# Patient Record
Sex: MALE | Race: BLACK OR AFRICAN AMERICAN | NOT HISPANIC OR LATINO | Employment: FULL TIME | URBAN - METROPOLITAN AREA
[De-identification: names, ages, dates, MRNs, and addresses within clinical notes are randomized per-mention and may not be internally consistent; named-entity substitution may affect disease eponyms.]

---

## 2016-11-28 LAB — CRC RECOMMENDATION EXT: NORMAL

## 2017-02-17 ENCOUNTER — HISTORICAL (OUTPATIENT)
Dept: ADMINISTRATIVE | Facility: HOSPITAL | Age: 52
End: 2017-02-17

## 2017-05-16 ENCOUNTER — HISTORICAL (OUTPATIENT)
Dept: ADMINISTRATIVE | Facility: HOSPITAL | Age: 52
End: 2017-05-16

## 2017-05-16 LAB
ABS NEUT (OLG): 4.2 X10(3)/MCL (ref 2.1–9.2)
ALBUMIN SERPL-MCNC: 4 GM/DL (ref 3.4–5)
ALBUMIN/GLOB SERPL: 1 RATIO (ref 1–2)
ALP SERPL-CCNC: 81 UNIT/L (ref 20–120)
ALT SERPL-CCNC: 40 UNIT/L
AST SERPL-CCNC: 36 UNIT/L
BASOPHILS # BLD AUTO: 0.01 X10(3)/MCL
BASOPHILS NFR BLD AUTO: 0 % (ref 0–1)
BILIRUB SERPL-MCNC: 0.8 MG/DL
BILIRUBIN DIRECT+TOT PNL SERPL-MCNC: 0.1 MG/DL
BILIRUBIN DIRECT+TOT PNL SERPL-MCNC: 0.7 MG/DL
BUN SERPL-MCNC: 13 MG/DL (ref 7–25)
CALCIUM SERPL-MCNC: 9.7 MG/DL (ref 8.4–10.3)
CHLORIDE SERPL-SCNC: 99 MMOL/L (ref 96–110)
CO2 SERPL-SCNC: 33 MMOL/L (ref 24–32)
CREAT SERPL-MCNC: 1.08 MG/DL (ref 0.7–1.4)
EOSINOPHIL # BLD AUTO: 0.16 10*3/UL
EOSINOPHIL NFR BLD AUTO: 2 % (ref 0–5)
ERYTHROCYTE [DISTWIDTH] IN BLOOD BY AUTOMATED COUNT: 12.1 % (ref 11.5–14.5)
GLOBULIN SER-MCNC: 3.9 GM/ML (ref 2.3–3.5)
GLUCOSE SERPL-MCNC: 102 MG/DL (ref 65–99)
HCT VFR BLD AUTO: 44.5 % (ref 40–51)
HGB BLD-MCNC: 14.8 GM/DL (ref 13.5–17.5)
IMM GRANULOCYTES # BLD AUTO: 0.01 10*3/UL
IMM GRANULOCYTES NFR BLD AUTO: 0 %
LYMPHOCYTES # BLD AUTO: 2.47 X10(3)/MCL
LYMPHOCYTES NFR BLD AUTO: 33 % (ref 15–40)
MCH RBC QN AUTO: 31.3 PG (ref 26–34)
MCHC RBC AUTO-ENTMCNC: 33.3 GM/DL (ref 31–37)
MCV RBC AUTO: 94.1 FL (ref 80–100)
MONOCYTES # BLD AUTO: 0.65 X10(3)/MCL
MONOCYTES NFR BLD AUTO: 9 % (ref 4–12)
NEUTROPHILS # BLD AUTO: 4.2 X10(3)/MCL
NEUTROPHILS NFR BLD AUTO: 56 X10(3)/MCL
PLATELET # BLD AUTO: 170 X10(3)/MCL (ref 130–400)
PMV BLD AUTO: 10.4 FL (ref 7.4–10.4)
POTASSIUM SERPL-SCNC: 4.6 MMOL/L (ref 3.6–5.2)
PROT SERPL-MCNC: 7.9 GM/DL (ref 6–8)
RBC # BLD AUTO: 4.73 X10(6)/MCL (ref 4.5–5.9)
SODIUM SERPL-SCNC: 139 MMOL/L (ref 135–146)
WBC # SPEC AUTO: 7.5 X10(3)/MCL (ref 4.5–11)

## 2017-09-06 ENCOUNTER — HISTORICAL (OUTPATIENT)
Dept: ADMINISTRATIVE | Facility: HOSPITAL | Age: 52
End: 2017-09-06

## 2017-09-06 LAB
ABS NEUT (OLG): 3.27 X10(3)/MCL (ref 2.1–9.2)
ABS NEUT (OLG): 3.31 X10(3)/MCL (ref 2.1–9.2)
ALBUMIN SERPL-MCNC: 3.8 GM/DL (ref 3.4–5)
ALBUMIN/GLOB SERPL: 1 RATIO (ref 1–2)
ALP SERPL-CCNC: 115 UNIT/L (ref 45–117)
ALT SERPL-CCNC: 35 UNIT/L (ref 12–78)
APPEARANCE, UA: CLEAR
AST SERPL-CCNC: 26 UNIT/L (ref 15–37)
BACTERIA #/AREA URNS AUTO: ABNORMAL /[HPF]
BASOPHILS # BLD AUTO: 0.02 X10(3)/MCL
BASOPHILS # BLD AUTO: 0.03 X10(3)/MCL
BASOPHILS NFR BLD AUTO: 0 % (ref 0–1)
BASOPHILS NFR BLD AUTO: 0 % (ref 0–1)
BILIRUB SERPL-MCNC: 0.3 MG/DL (ref 0.2–1)
BILIRUB UR QL STRIP: NEGATIVE
BILIRUBIN DIRECT+TOT PNL SERPL-MCNC: 0.1 MG/DL
BILIRUBIN DIRECT+TOT PNL SERPL-MCNC: 0.2 MG/DL
BUN SERPL-MCNC: 9 MG/DL (ref 7–18)
CALCIUM SERPL-MCNC: 8.8 MG/DL (ref 8.5–10.1)
CD3+CD4+ CELLS # SPEC: 1368 UNIT/L (ref 589–1505)
CD3+CD4+ CELLS NFR BLD: 51.8 % (ref 31–59)
CHLORIDE SERPL-SCNC: 104 MMOL/L (ref 98–107)
CO2 SERPL-SCNC: 32 MMOL/L (ref 21–32)
COLOR UR: NORMAL
CREAT SERPL-MCNC: 1.2 MG/DL (ref 0.6–1.3)
DEPRECATED CALCIDIOL+CALCIFEROL SERPL-MC: 9.82 NG/ML (ref 30–80)
EOSINOPHIL # BLD AUTO: 0.18 10*3/UL
EOSINOPHIL # BLD AUTO: 0.2 X10(3)/MCL
EOSINOPHIL NFR BLD AUTO: 3 % (ref 0–5)
EOSINOPHIL NFR BLD AUTO: 3 % (ref 0–5)
ERYTHROCYTE [DISTWIDTH] IN BLOOD BY AUTOMATED COUNT: 12.5 % (ref 11.5–14.5)
ERYTHROCYTE [DISTWIDTH] IN BLOOD BY AUTOMATED COUNT: 12.7 % (ref 11.5–14.5)
EST. AVERAGE GLUCOSE BLD GHB EST-MCNC: 120 MG/DL
GLOBULIN SER-MCNC: 3.9 GM/ML (ref 2.3–3.5)
GLUCOSE (UA): NORMAL
GLUCOSE SERPL-MCNC: 94 MG/DL (ref 74–106)
HBA1C MFR BLD: 5.8 % (ref 4.2–6.3)
HCT VFR BLD AUTO: 39.8 % (ref 40–51)
HCT VFR BLD AUTO: 40.4 % (ref 40–51)
HCV AB SERPL QL IA: NONREACTIVE
HGB BLD-MCNC: 13.5 GM/DL (ref 13.5–17.5)
HGB BLD-MCNC: 13.6 GM/DL (ref 13.5–17.5)
HGB UR QL STRIP: NEGATIVE
HYALINE CASTS #/AREA URNS LPF: ABNORMAL /[LPF]
IMM GRANULOCYTES # BLD AUTO: 0.01 10*3/UL
IMM GRANULOCYTES # BLD AUTO: 0.02 10*3/UL
IMM GRANULOCYTES NFR BLD AUTO: 0 %
IMM GRANULOCYTES NFR BLD AUTO: 0 %
KETONES UR QL STRIP: NEGATIVE
LEUKOCYTE ESTERASE UR QL STRIP: NEGATIVE
LYMPHOCYTES # BLD AUTO: 2.62 X10(3)/MCL
LYMPHOCYTES # BLD AUTO: 2.67 X10(3)/MCL
LYMPHOCYTES # BLD AUTO: 2640 UNIT/L (ref 1260–5520)
LYMPHOCYTES NFR BLD AUTO: 39 % (ref 15–40)
LYMPHOCYTES NFR BLD AUTO: 40 % (ref 15–40)
LYMPHOCYTES NFR LN MANUAL: 40 % (ref 28–48)
LYMPHOMA - T-CELL MARKERS SPEC-IMP: NORMAL
MCH RBC QN AUTO: 31.3 PG (ref 26–34)
MCH RBC QN AUTO: 32.2 PG (ref 26–34)
MCHC RBC AUTO-ENTMCNC: 33.4 GM/DL (ref 31–37)
MCHC RBC AUTO-ENTMCNC: 34.2 GM/DL (ref 31–37)
MCV RBC AUTO: 93.5 FL (ref 80–100)
MCV RBC AUTO: 94.3 FL (ref 80–100)
MONOCYTES # BLD AUTO: 0.46 X10(3)/MCL
MONOCYTES # BLD AUTO: 0.54 X10(3)/MCL
MONOCYTES NFR BLD AUTO: 7 % (ref 4–12)
MONOCYTES NFR BLD AUTO: 8 % (ref 4–12)
NEG CONT SPOT COUNT: 0
NEUTROPHILS # BLD AUTO: 3.27 X10(3)/MCL
NEUTROPHILS # BLD AUTO: 3.31 X10(3)/MCL
NEUTROPHILS NFR BLD AUTO: 49 X10(3)/MCL
NEUTROPHILS NFR BLD AUTO: 50 X10(3)/MCL
NITRITE UR QL STRIP: NEGATIVE
PANEL A SPOT COUNT: 1
PANEL B SPOT COUNT: 1
PH UR STRIP: 5.5 [PH] (ref 4.5–8)
PLATELET # BLD AUTO: 137 X10(3)/MCL (ref 130–400)
PLATELET # BLD AUTO: 142 X10(3)/MCL (ref 130–400)
PMV BLD AUTO: 10.4 FL (ref 7.4–10.4)
PMV BLD AUTO: 10.5 FL (ref 7.4–10.4)
POS CONT SPOT COUNT: >20
POTASSIUM SERPL-SCNC: 3.3 MMOL/L (ref 3.5–5.1)
PROT SERPL-MCNC: 7.7 GM/DL (ref 6.4–8.2)
PROT UR QL STRIP: NEGATIVE
RBC # BLD AUTO: 4.22 X10(6)/MCL (ref 4.5–5.9)
RBC # BLD AUTO: 4.32 X10(6)/MCL (ref 4.5–5.9)
RBC #/AREA URNS AUTO: ABNORMAL /[HPF]
SODIUM SERPL-SCNC: 140 MMOL/L (ref 136–145)
SP GR UR STRIP: 1.02 (ref 1–1.03)
SQUAMOUS #/AREA URNS LPF: ABNORMAL /[LPF]
T PALLIDUM AB SER QL: NONREACTIVE
T-SPOT.TB: NEGATIVE
TSH SERPL-ACNC: 1.67 MIU/L (ref 0.36–3.74)
UROBILINOGEN UR STRIP-ACNC: NORMAL
WBC # BLD AUTO: 6600 /MM3 (ref 4500–11500)
WBC # SPEC AUTO: 6.6 X10(3)/MCL (ref 4.5–11)
WBC # SPEC AUTO: 6.8 X10(3)/MCL (ref 4.5–11)
WBC #/AREA URNS AUTO: ABNORMAL /HPF

## 2017-10-09 ENCOUNTER — HISTORICAL (OUTPATIENT)
Dept: LAB | Facility: HOSPITAL | Age: 52
End: 2017-10-09

## 2017-10-09 LAB
HBV SURFACE AB SER-ACNC: 26.48 MIU/ML
HBV SURFACE AB SERPL IA-ACNC: REACTIVE M[IU]/ML

## 2018-01-15 ENCOUNTER — HISTORICAL (OUTPATIENT)
Dept: RADIOLOGY | Facility: HOSPITAL | Age: 53
End: 2018-01-15

## 2018-01-15 ENCOUNTER — HISTORICAL (OUTPATIENT)
Dept: ADMINISTRATIVE | Facility: HOSPITAL | Age: 53
End: 2018-01-15

## 2018-01-15 LAB
ABS NEUT (OLG): 5.2 X10(3)/MCL (ref 2.1–9.2)
ABS NEUT (OLG): 5.25 X10(3)/MCL (ref 2.1–9.2)
ALBUMIN SERPL-MCNC: 4.1 GM/DL (ref 3.4–5)
ALBUMIN/GLOB SERPL: 1 RATIO (ref 1–2)
ALP SERPL-CCNC: 96 UNIT/L (ref 45–117)
ALT SERPL-CCNC: 31 UNIT/L (ref 12–78)
AMYLASE SERPL-CCNC: 64 UNIT/L (ref 25–115)
AST SERPL-CCNC: 23 UNIT/L (ref 15–37)
BASOPHILS # BLD AUTO: 0.03 X10(3)/MCL
BASOPHILS # BLD AUTO: 0.03 X10(3)/MCL
BASOPHILS NFR BLD AUTO: 0 % (ref 0–1)
BASOPHILS NFR BLD AUTO: 0 % (ref 0–1)
BILIRUB SERPL-MCNC: 0.3 MG/DL (ref 0.2–1)
BILIRUBIN DIRECT+TOT PNL SERPL-MCNC: <0.1 MG/DL
BILIRUBIN DIRECT+TOT PNL SERPL-MCNC: ABNORMAL MG/DL
BUN SERPL-MCNC: 17 MG/DL (ref 7–18)
CALCIUM SERPL-MCNC: 9.8 MG/DL (ref 8.5–10.1)
CD3+CD4+ CELLS # SPEC: 1401 UNIT/L (ref 589–1505)
CD3+CD4+ CELLS NFR BLD: 50.2 % (ref 31–59)
CHLORIDE SERPL-SCNC: 100 MMOL/L (ref 98–107)
CO2 SERPL-SCNC: 29 MMOL/L (ref 21–32)
CREAT SERPL-MCNC: 1.2 MG/DL (ref 0.6–1.3)
DEPRECATED CALCIDIOL+CALCIFEROL SERPL-MC: 57.41 NG/ML (ref 30–80)
EOSINOPHIL # BLD AUTO: 0.24 10*3/UL
EOSINOPHIL # BLD AUTO: 0.25 10*3/UL
EOSINOPHIL NFR BLD AUTO: 3 % (ref 0–5)
EOSINOPHIL NFR BLD AUTO: 3 % (ref 0–5)
ERYTHROCYTE [DISTWIDTH] IN BLOOD BY AUTOMATED COUNT: 12.1 % (ref 11.5–14.5)
ERYTHROCYTE [DISTWIDTH] IN BLOOD BY AUTOMATED COUNT: 12.2 % (ref 11.5–14.5)
GLOBULIN SER-MCNC: 4.4 GM/ML (ref 2.3–3.5)
GLUCOSE SERPL-MCNC: 97 MG/DL (ref 74–106)
HCT VFR BLD AUTO: 45.4 % (ref 40–51)
HCT VFR BLD AUTO: 45.5 % (ref 40–51)
HGB BLD-MCNC: 15.7 GM/DL (ref 13.5–17.5)
HGB BLD-MCNC: 15.8 GM/DL (ref 13.5–17.5)
IMM GRANULOCYTES # BLD AUTO: 0.02 10*3/UL
IMM GRANULOCYTES # BLD AUTO: 0.03 10*3/UL
IMM GRANULOCYTES NFR BLD AUTO: 0 %
IMM GRANULOCYTES NFR BLD AUTO: 0 %
LIPASE SERPL-CCNC: 170 UNIT/L (ref 73–393)
LYMPHOCYTES # BLD AUTO: 2.77 X10(3)/MCL
LYMPHOCYTES # BLD AUTO: 2.78 X10(3)/MCL
LYMPHOCYTES # BLD AUTO: 2790 UNIT/L (ref 1260–5520)
LYMPHOCYTES NFR BLD AUTO: 30 % (ref 15–40)
LYMPHOCYTES NFR BLD AUTO: 31 % (ref 15–40)
LYMPHOCYTES NFR LN MANUAL: 31 % (ref 28–48)
LYMPHOMA - T-CELL MARKERS SPEC-IMP: NORMAL
MCH RBC QN AUTO: 32 PG (ref 26–34)
MCH RBC QN AUTO: 32 PG (ref 26–34)
MCHC RBC AUTO-ENTMCNC: 34.6 GM/DL (ref 31–37)
MCHC RBC AUTO-ENTMCNC: 34.7 GM/DL (ref 31–37)
MCV RBC AUTO: 92.3 FL (ref 80–100)
MCV RBC AUTO: 92.7 FL (ref 80–100)
MONOCYTES # BLD AUTO: 0.72 X10(3)/MCL
MONOCYTES # BLD AUTO: 0.77 X10(3)/MCL
MONOCYTES NFR BLD AUTO: 8 % (ref 4–12)
MONOCYTES NFR BLD AUTO: 8 % (ref 4–12)
NEUTROPHILS # BLD AUTO: 5.2 X10(3)/MCL
NEUTROPHILS # BLD AUTO: 5.25 X10(3)/MCL
NEUTROPHILS NFR BLD AUTO: 58 X10(3)/MCL
NEUTROPHILS NFR BLD AUTO: 58 X10(3)/MCL
PLATELET # BLD AUTO: 171 X10(3)/MCL (ref 130–400)
PLATELET # BLD AUTO: 174 X10(3)/MCL (ref 130–400)
PMV BLD AUTO: 10.6 FL (ref 7.4–10.4)
PMV BLD AUTO: 10.8 FL (ref 7.4–10.4)
POTASSIUM SERPL-SCNC: 3.6 MMOL/L (ref 3.5–5.1)
PROT SERPL-MCNC: 8.5 GM/DL (ref 6.4–8.2)
RBC # BLD AUTO: 4.9 X10(6)/MCL (ref 4.5–5.9)
RBC # BLD AUTO: 4.93 X10(6)/MCL (ref 4.5–5.9)
SODIUM SERPL-SCNC: 137 MMOL/L (ref 136–145)
WBC # BLD AUTO: 9000 /MM3 (ref 4500–11500)
WBC # SPEC AUTO: 9 X10(3)/MCL (ref 4.5–11)
WBC # SPEC AUTO: 9.1 X10(3)/MCL (ref 4.5–11)

## 2018-02-15 ENCOUNTER — HISTORICAL (OUTPATIENT)
Dept: ADMINISTRATIVE | Facility: HOSPITAL | Age: 53
End: 2018-02-15

## 2018-07-02 ENCOUNTER — HISTORICAL (OUTPATIENT)
Dept: ADMINISTRATIVE | Facility: HOSPITAL | Age: 53
End: 2018-07-02

## 2018-07-02 LAB
ABS NEUT (OLG): 4.42 X10(3)/MCL (ref 2.1–9.2)
ABS NEUT (OLG): 4.64 X10(3)/MCL (ref 2.1–9.2)
ALBUMIN SERPL-MCNC: 4.1 GM/DL (ref 3.4–5)
ALBUMIN/GLOB SERPL: 1 RATIO (ref 1–2)
ALP SERPL-CCNC: 109 UNIT/L (ref 45–117)
ALT SERPL-CCNC: 36 UNIT/L (ref 12–78)
AST SERPL-CCNC: 28 UNIT/L (ref 15–37)
BASOPHILS # BLD AUTO: 0.03 X10(3)/MCL
BASOPHILS # BLD AUTO: 0.03 X10(3)/MCL
BASOPHILS NFR BLD AUTO: 0 %
BASOPHILS NFR BLD AUTO: 0 %
BILIRUB SERPL-MCNC: 0.3 MG/DL (ref 0.2–1)
BILIRUBIN DIRECT+TOT PNL SERPL-MCNC: <0.1 MG/DL
BILIRUBIN DIRECT+TOT PNL SERPL-MCNC: >0.2 MG/DL
BUN SERPL-MCNC: 22 MG/DL (ref 7–18)
CALCIUM SERPL-MCNC: 9.2 MG/DL (ref 8.5–10.1)
CD3+CD4+ CELLS # SPEC: 1899 UNIT/L (ref 589–1505)
CD3+CD4+ CELLS NFR BLD: 54.7 % (ref 31–59)
CHLORIDE SERPL-SCNC: 100 MMOL/L (ref 98–107)
CO2 SERPL-SCNC: 29 MMOL/L (ref 21–32)
CREAT SERPL-MCNC: 1.3 MG/DL (ref 0.6–1.3)
EOSINOPHIL # BLD AUTO: 0.26 X10(3)/MCL
EOSINOPHIL # BLD AUTO: 0.27 10*3/UL
EOSINOPHIL NFR BLD AUTO: 3 %
EOSINOPHIL NFR BLD AUTO: 3 %
ERYTHROCYTE [DISTWIDTH] IN BLOOD BY AUTOMATED COUNT: 12.4 % (ref 11.5–14.5)
ERYTHROCYTE [DISTWIDTH] IN BLOOD BY AUTOMATED COUNT: 12.4 % (ref 11.5–14.5)
GLOBULIN SER-MCNC: 4.3 GM/ML (ref 2.3–3.5)
GLUCOSE SERPL-MCNC: 95 MG/DL (ref 74–106)
HCT VFR BLD AUTO: 47.3 % (ref 40–51)
HCT VFR BLD AUTO: 47.3 % (ref 40–51)
HGB BLD-MCNC: 15.9 GM/DL (ref 13.5–17.5)
HGB BLD-MCNC: 16.1 GM/DL (ref 13.5–17.5)
IMM GRANULOCYTES # BLD AUTO: 0.05 10*3/UL
IMM GRANULOCYTES # BLD AUTO: 0.05 10*3/UL
IMM GRANULOCYTES NFR BLD AUTO: 0 %
IMM GRANULOCYTES NFR BLD AUTO: 1 %
LYMPHOCYTES # BLD AUTO: 3.46 X10(3)/MCL
LYMPHOCYTES # BLD AUTO: 3.59 X10(3)/MCL
LYMPHOCYTES # BLD AUTO: 3471 UNIT/L (ref 1260–5520)
LYMPHOCYTES NFR BLD AUTO: 38 % (ref 13–40)
LYMPHOCYTES NFR BLD AUTO: 39 % (ref 13–40)
LYMPHOCYTES NFR LN MANUAL: 39 % (ref 28–48)
LYMPHOMA - T-CELL MARKERS SPEC-IMP: ABNORMAL
MCH RBC QN AUTO: 32.2 PG (ref 26–34)
MCH RBC QN AUTO: 32.6 PG (ref 26–34)
MCHC RBC AUTO-ENTMCNC: 33.6 GM/DL (ref 31–37)
MCHC RBC AUTO-ENTMCNC: 34 GM/DL (ref 31–37)
MCV RBC AUTO: 95.7 FL (ref 80–100)
MCV RBC AUTO: 95.7 FL (ref 80–100)
MONOCYTES # BLD AUTO: 0.7 X10(3)/MCL
MONOCYTES # BLD AUTO: 0.78 X10(3)/MCL
MONOCYTES NFR BLD AUTO: 8 % (ref 4–12)
MONOCYTES NFR BLD AUTO: 8 % (ref 4–12)
NEUTROPHILS # BLD AUTO: 4.42 X10(3)/MCL
NEUTROPHILS # BLD AUTO: 4.64 X10(3)/MCL
NEUTROPHILS NFR BLD AUTO: 50 X10(3)/MCL
NEUTROPHILS NFR BLD AUTO: 50 X10(3)/MCL
PLATELET # BLD AUTO: 176 X10(3)/MCL (ref 130–400)
PLATELET # BLD AUTO: 178 X10(3)/MCL (ref 130–400)
PMV BLD AUTO: 10.9 FL (ref 7.4–10.4)
PMV BLD AUTO: 11.5 FL (ref 7.4–10.4)
POTASSIUM SERPL-SCNC: 3.4 MMOL/L (ref 3.5–5.1)
PROT SERPL-MCNC: 8.4 GM/DL (ref 6.4–8.2)
RBC # BLD AUTO: 4.94 X10(6)/MCL (ref 4.5–5.9)
RBC # BLD AUTO: 4.94 X10(6)/MCL (ref 4.5–5.9)
SODIUM SERPL-SCNC: 136 MMOL/L (ref 136–145)
WBC # BLD AUTO: 8900 /MM3 (ref 4500–11500)
WBC # SPEC AUTO: 8.9 X10(3)/MCL (ref 4.5–11)
WBC # SPEC AUTO: 9.4 X10(3)/MCL (ref 4.5–11)

## 2018-10-10 ENCOUNTER — HISTORICAL (OUTPATIENT)
Dept: INTERNAL MEDICINE | Facility: CLINIC | Age: 53
End: 2018-10-10

## 2018-10-10 LAB
APPEARANCE, UA: CLEAR
BACTERIA #/AREA URNS AUTO: ABNORMAL /[HPF]
BILIRUB UR QL STRIP: NEGATIVE
CHOLEST SERPL-MCNC: 145 MG/DL
CHOLEST/HDLC SERPL: 3.6 {RATIO} (ref 0–5)
COLOR UR: YELLOW
EST. AVERAGE GLUCOSE BLD GHB EST-MCNC: 117 MG/DL
GLUCOSE (UA): NORMAL
HBA1C MFR BLD: 5.7 % (ref 4.2–6.3)
HDLC SERPL-MCNC: 40 MG/DL
HGB UR QL STRIP: NEGATIVE
HYALINE CASTS #/AREA URNS LPF: ABNORMAL /[LPF]
KETONES UR QL STRIP: ABNORMAL
LDLC SERPL CALC-MCNC: 72 MG/DL (ref 0–130)
LEUKOCYTE ESTERASE UR QL STRIP: 25 LEU/UL
NITRITE UR QL STRIP: NEGATIVE
PH UR STRIP: 6 [PH] (ref 4.5–8)
PROT UR QL STRIP: 10 MG/DL
PSA SERPL-MCNC: 0.8 NG/ML
RBC #/AREA URNS AUTO: ABNORMAL /[HPF]
SP GR UR STRIP: 1.03 (ref 1–1.03)
SQUAMOUS #/AREA URNS LPF: ABNORMAL /[LPF]
TRIGL SERPL-MCNC: 167 MG/DL
TSH SERPL-ACNC: 1.51 MIU/L (ref 0.36–3.74)
UROBILINOGEN UR STRIP-ACNC: 4 MG/DL
VLDLC SERPL CALC-MCNC: 33 MG/DL
WBC #/AREA URNS AUTO: ABNORMAL /HPF

## 2019-05-31 ENCOUNTER — HISTORICAL (OUTPATIENT)
Dept: ADMINISTRATIVE | Facility: HOSPITAL | Age: 54
End: 2019-05-31

## 2019-05-31 LAB
ABS NEUT (OLG): 3.64 X10(3)/MCL (ref 2.1–9.2)
ALBUMIN SERPL-MCNC: 3.9 GM/DL (ref 3.4–5)
ALBUMIN/GLOB SERPL: 1.1 RATIO (ref 1.1–2)
ALP SERPL-CCNC: 113 UNIT/L (ref 45–117)
ALT SERPL-CCNC: 30 UNIT/L (ref 12–78)
APPEARANCE, UA: CLEAR
AST SERPL-CCNC: 24 UNIT/L (ref 15–37)
BACTERIA #/AREA URNS AUTO: ABNORMAL /[HPF]
BASOPHILS # BLD AUTO: 0.02 X10(3)/MCL
BASOPHILS NFR BLD AUTO: 0 %
BILIRUB SERPL-MCNC: 0.2 MG/DL (ref 0.2–1)
BILIRUB UR QL STRIP: NEGATIVE
BILIRUBIN DIRECT+TOT PNL SERPL-MCNC: 0.1 MG/DL
BILIRUBIN DIRECT+TOT PNL SERPL-MCNC: 0.1 MG/DL
BUN SERPL-MCNC: 15 MG/DL (ref 7–18)
CALCIUM SERPL-MCNC: 9.2 MG/DL (ref 8.5–10.1)
CD3+CD4+ CELLS # SPEC: 1386 UNIT/L (ref 589–1505)
CD3+CD4+ CELLS NFR BLD: 49.3 % (ref 31–59)
CHLORIDE SERPL-SCNC: 105 MMOL/L (ref 98–107)
CO2 SERPL-SCNC: 31 MMOL/L (ref 21–32)
COLOR UR: YELLOW
CREAT SERPL-MCNC: 1 MG/DL (ref 0.6–1.3)
DEPRECATED CALCIDIOL+CALCIFEROL SERPL-MC: 17.57 NG/ML (ref 30–80)
EOSINOPHIL # BLD AUTO: 0.23 10*3/UL
EOSINOPHIL NFR BLD AUTO: 3 %
ERYTHROCYTE [DISTWIDTH] IN BLOOD BY AUTOMATED COUNT: 12.5 % (ref 11.5–14.5)
GLOBULIN SER-MCNC: 3.7 GM/ML (ref 2.3–3.5)
GLUCOSE (UA): NORMAL
GLUCOSE SERPL-MCNC: 96 MG/DL (ref 74–106)
HCT VFR BLD AUTO: 42.5 % (ref 40–51)
HCV AB SERPL QL IA: NONREACTIVE
HGB BLD-MCNC: 14 GM/DL (ref 13.5–17.5)
HGB UR QL STRIP: NEGATIVE
HYALINE CASTS #/AREA URNS LPF: ABNORMAL /[LPF]
IMM GRANULOCYTES # BLD AUTO: 0.02 10*3/UL
IMM GRANULOCYTES NFR BLD AUTO: 0 %
KETONES UR QL STRIP: NEGATIVE
LEUKOCYTE ESTERASE UR QL STRIP: 75 LEU/UL
LYMPHOCYTES # BLD AUTO: 2.8 X10(3)/MCL
LYMPHOCYTES # BLD AUTO: 2812 UNIT/L (ref 1260–5520)
LYMPHOCYTES NFR BLD AUTO: 38 % (ref 13–40)
LYMPHOCYTES NFR LN MANUAL: 38 % (ref 28–48)
LYMPHOMA - T-CELL MARKERS SPEC-IMP: NORMAL
MCH RBC QN AUTO: 31.4 PG (ref 26–34)
MCHC RBC AUTO-ENTMCNC: 32.9 GM/DL (ref 31–37)
MCV RBC AUTO: 95.3 FL (ref 80–100)
MONOCYTES # BLD AUTO: 0.69 X10(3)/MCL
MONOCYTES NFR BLD AUTO: 9 % (ref 4–12)
NEUTROPHILS # BLD AUTO: 3.64 X10(3)/MCL
NEUTROPHILS NFR BLD AUTO: 49 X10(3)/MCL
NITRITE UR QL STRIP: NEGATIVE
PH UR STRIP: 6 [PH] (ref 4.5–8)
PLATELET # BLD AUTO: 155 X10(3)/MCL (ref 130–400)
PMV BLD AUTO: 10.4 FL (ref 7.4–10.4)
POTASSIUM SERPL-SCNC: 3.8 MMOL/L (ref 3.5–5.1)
PROT SERPL-MCNC: 7.6 GM/DL (ref 6.4–8.2)
PROT UR QL STRIP: NEGATIVE
RBC # BLD AUTO: 4.46 X10(6)/MCL (ref 4.5–5.9)
RBC #/AREA URNS AUTO: ABNORMAL /[HPF]
SODIUM SERPL-SCNC: 139 MMOL/L (ref 136–145)
SP GR UR STRIP: 1.02 (ref 1–1.03)
SQUAMOUS #/AREA URNS LPF: ABNORMAL /[LPF]
T PALLIDUM AB SER QL: NONREACTIVE
UROBILINOGEN UR STRIP-ACNC: 2 MG/DL
WBC # BLD AUTO: 7400 /MM3 (ref 4500–11500)
WBC # SPEC AUTO: 7.4 X10(3)/MCL (ref 4.5–11)
WBC #/AREA URNS AUTO: ABNORMAL /HPF

## 2019-10-09 ENCOUNTER — HISTORICAL (OUTPATIENT)
Dept: INTERNAL MEDICINE | Facility: CLINIC | Age: 54
End: 2019-10-09

## 2019-10-09 LAB
ABS NEUT (OLG): 3.5 X10(3)/MCL (ref 2.1–9.2)
ALBUMIN SERPL-MCNC: 3.7 GM/DL (ref 3.4–5)
ALBUMIN/GLOB SERPL: 0.9 RATIO (ref 1.1–2)
ALP SERPL-CCNC: 134 UNIT/L (ref 45–117)
ALT SERPL-CCNC: 33 UNIT/L (ref 12–78)
APPEARANCE, UA: CLEAR
AST SERPL-CCNC: 29 UNIT/L (ref 15–37)
BACTERIA #/AREA URNS AUTO: ABNORMAL /[HPF]
BASOPHILS # BLD AUTO: 0 X10(3)/MCL (ref 0–0.2)
BASOPHILS NFR BLD AUTO: 1 %
BILIRUB SERPL-MCNC: 0.2 MG/DL (ref 0.2–1)
BILIRUB UR QL STRIP: NEGATIVE
BILIRUBIN DIRECT+TOT PNL SERPL-MCNC: <0.1 MG/DL (ref 0–0.2)
BILIRUBIN DIRECT+TOT PNL SERPL-MCNC: >0.1 MG/DL
BUN SERPL-MCNC: 15 MG/DL (ref 7–18)
CALCIUM SERPL-MCNC: 8.8 MG/DL (ref 8.5–10.1)
CHLORIDE SERPL-SCNC: 107 MMOL/L (ref 98–107)
CHOLEST SERPL-MCNC: 191 MG/DL
CHOLEST/HDLC SERPL: 3.8 {RATIO} (ref 0–5)
CO2 SERPL-SCNC: 31 MMOL/L (ref 21–32)
COLOR UR: ABNORMAL
CREAT SERPL-MCNC: 1.3 MG/DL (ref 0.6–1.3)
EOSINOPHIL # BLD AUTO: 0.3 X10(3)/MCL (ref 0–0.9)
EOSINOPHIL NFR BLD AUTO: 4 %
ERYTHROCYTE [DISTWIDTH] IN BLOOD BY AUTOMATED COUNT: 12.5 % (ref 11.5–14.5)
GLOBULIN SER-MCNC: 3.9 GM/ML (ref 2.3–3.5)
GLUCOSE (UA): NORMAL
GLUCOSE SERPL-MCNC: 106 MG/DL (ref 74–106)
HCT VFR BLD AUTO: 44 % (ref 40–51)
HDLC SERPL-MCNC: 50 MG/DL (ref 40–59)
HGB BLD-MCNC: 14.7 GM/DL (ref 13.5–17.5)
HGB UR QL STRIP: NEGATIVE
HYALINE CASTS #/AREA URNS LPF: ABNORMAL /[LPF]
IMM GRANULOCYTES # BLD AUTO: 0.01 10*3/UL
IMM GRANULOCYTES NFR BLD AUTO: 0 %
KETONES UR QL STRIP: NEGATIVE
LDLC SERPL CALC-MCNC: 99 MG/DL
LEUKOCYTE ESTERASE UR QL STRIP: NEGATIVE
LYMPHOCYTES # BLD AUTO: 3.4 X10(3)/MCL (ref 0.6–4.6)
LYMPHOCYTES NFR BLD AUTO: 42 %
MCH RBC QN AUTO: 32.1 PG (ref 26–34)
MCHC RBC AUTO-ENTMCNC: 33.4 GM/DL (ref 31–37)
MCV RBC AUTO: 96.1 FL (ref 80–100)
MONOCYTES # BLD AUTO: 0.8 X10(3)/MCL (ref 0.1–1.3)
MONOCYTES NFR BLD AUTO: 10 %
NEUTROPHILS # BLD AUTO: 3.5 X10(3)/MCL (ref 2.1–9.2)
NEUTROPHILS NFR BLD AUTO: 44 %
NITRITE UR QL STRIP: NEGATIVE
PH UR STRIP: 6 [PH] (ref 4.5–8)
PLATELET # BLD AUTO: 159 X10(3)/MCL (ref 130–400)
PMV BLD AUTO: 10.2 FL (ref 7.4–10.4)
POTASSIUM SERPL-SCNC: 4.2 MMOL/L (ref 3.5–5.1)
PROT SERPL-MCNC: 7.6 GM/DL (ref 6.4–8.2)
PROT UR QL STRIP: 10 MG/DL
RBC # BLD AUTO: 4.58 X10(6)/MCL (ref 4.5–5.9)
RBC #/AREA URNS AUTO: ABNORMAL /[HPF]
SODIUM SERPL-SCNC: 140 MMOL/L (ref 136–145)
SP GR UR STRIP: 1.03 (ref 1–1.03)
SQUAMOUS #/AREA URNS LPF: ABNORMAL /[LPF]
TRIGL SERPL-MCNC: 208 MG/DL
UROBILINOGEN UR STRIP-ACNC: 2 MG/DL
VLDLC SERPL CALC-MCNC: 42 MG/DL
WBC # SPEC AUTO: 8 X10(3)/MCL (ref 4.5–11)
WBC #/AREA URNS AUTO: ABNORMAL /HPF

## 2019-11-11 ENCOUNTER — HISTORICAL (OUTPATIENT)
Dept: ADMINISTRATIVE | Facility: HOSPITAL | Age: 54
End: 2019-11-11

## 2019-11-11 LAB
ABS NEUT (OLG): 3.73 X10(3)/MCL (ref 2.1–9.2)
BASOPHILS # BLD AUTO: 0 X10(3)/MCL (ref 0–0.2)
BASOPHILS NFR BLD AUTO: 0 %
CD3+CD4+ CELLS # SPEC: 1743 UNIT/L (ref 589–1505)
CD3+CD4+ CELLS NFR BLD: 57.3 % (ref 31–59)
EOSINOPHIL # BLD AUTO: 0.3 X10(3)/MCL (ref 0–0.9)
EOSINOPHIL NFR BLD AUTO: 4 %
ERYTHROCYTE [DISTWIDTH] IN BLOOD BY AUTOMATED COUNT: 12.5 % (ref 11.5–14.5)
HCT VFR BLD AUTO: 43.1 % (ref 40–51)
HGB BLD-MCNC: 14.4 GM/DL (ref 13.5–17.5)
IMM GRANULOCYTES # BLD AUTO: 0.01 10*3/UL
IMM GRANULOCYTES NFR BLD AUTO: 0 %
LYMPHOCYTES # BLD AUTO: 3 X10(3)/MCL (ref 0.6–4.6)
LYMPHOCYTES # BLD AUTO: 3042 UNIT/L (ref 1260–5520)
LYMPHOCYTES NFR BLD AUTO: 39 %
LYMPHOCYTES NFR LN MANUAL: 39 % (ref 28–48)
LYMPHOMA - T-CELL MARKERS SPEC-IMP: ABNORMAL
MCH RBC QN AUTO: 32.3 PG (ref 26–34)
MCHC RBC AUTO-ENTMCNC: 33.4 GM/DL (ref 31–37)
MCV RBC AUTO: 96.6 FL (ref 80–100)
MONOCYTES # BLD AUTO: 0.7 X10(3)/MCL (ref 0.1–1.3)
MONOCYTES NFR BLD AUTO: 9 %
NEUTROPHILS # BLD AUTO: 3.73 X10(3)/MCL (ref 2.1–9.2)
NEUTROPHILS NFR BLD AUTO: 48 %
PLATELET # BLD AUTO: 165 X10(3)/MCL (ref 130–400)
PMV BLD AUTO: 10.3 FL (ref 7.4–10.4)
RBC # BLD AUTO: 4.46 X10(6)/MCL (ref 4.5–5.9)
WBC # BLD AUTO: 7800 /MM3 (ref 4500–11500)
WBC # SPEC AUTO: 7.8 X10(3)/MCL (ref 4.5–11)

## 2020-01-24 ENCOUNTER — HISTORICAL (OUTPATIENT)
Dept: INTERNAL MEDICINE | Facility: CLINIC | Age: 55
End: 2020-01-24

## 2020-01-24 LAB
ALBUMIN SERPL-MCNC: 3.6 GM/DL (ref 3.4–5)
ALBUMIN/GLOB SERPL: 0.9 RATIO (ref 1.1–2)
ALP SERPL-CCNC: 105 UNIT/L (ref 45–117)
ALT SERPL-CCNC: 32 UNIT/L (ref 12–78)
APPEARANCE, UA: CLEAR
AST SERPL-CCNC: 25 UNIT/L (ref 15–37)
BACTERIA #/AREA URNS AUTO: ABNORMAL /HPF
BILIRUB SERPL-MCNC: 0.3 MG/DL (ref 0.2–1)
BILIRUB UR QL STRIP: NEGATIVE
BILIRUBIN DIRECT+TOT PNL SERPL-MCNC: <0.1 MG/DL (ref 0–0.2)
BILIRUBIN DIRECT+TOT PNL SERPL-MCNC: >0.2 MG/DL
BUN SERPL-MCNC: 17 MG/DL (ref 7–18)
CALCIUM SERPL-MCNC: 9.1 MG/DL (ref 8.5–10.1)
CHLORIDE SERPL-SCNC: 104 MMOL/L (ref 98–107)
CHOLEST SERPL-MCNC: 186 MG/DL
CHOLEST/HDLC SERPL: 4.2 {RATIO} (ref 0–5)
CO2 SERPL-SCNC: 29 MMOL/L (ref 21–32)
COLOR UR: YELLOW
CREAT SERPL-MCNC: 1.2 MG/DL (ref 0.6–1.3)
DEPRECATED CALCIDIOL+CALCIFEROL SERPL-MC: 11 NG/ML (ref 20–80)
EST. AVERAGE GLUCOSE BLD GHB EST-MCNC: 157 MG/DL
GLOBULIN SER-MCNC: 4 GM/ML (ref 2.3–3.5)
GLUCOSE (UA): NEGATIVE
GLUCOSE SERPL-MCNC: 100 MG/DL (ref 74–106)
HBA1C MFR BLD: 7.1 % (ref 4.2–6.3)
HDLC SERPL-MCNC: 44 MG/DL (ref 40–59)
HGB UR QL STRIP: NEGATIVE
HYALINE CASTS #/AREA URNS LPF: ABNORMAL /LPF
KETONES UR QL STRIP: NEGATIVE
LDLC SERPL CALC-MCNC: 123 MG/DL
LEUKOCYTE ESTERASE UR QL STRIP: 25 LEU/UL
NITRITE UR QL STRIP: NEGATIVE
PH UR STRIP: 6 [PH] (ref 4.5–8)
POTASSIUM SERPL-SCNC: 3.8 MMOL/L (ref 3.5–5.1)
PROT SERPL-MCNC: 7.6 GM/DL (ref 6.4–8.2)
PROT UR QL STRIP: NEGATIVE
PSA SERPL-MCNC: 0.6 NG/ML
RBC #/AREA URNS AUTO: ABNORMAL /HPF
SODIUM SERPL-SCNC: 139 MMOL/L (ref 136–145)
SP GR UR STRIP: 1.03 (ref 1–1.03)
SQUAMOUS #/AREA URNS LPF: ABNORMAL /LPF
TRIGL SERPL-MCNC: 97 MG/DL
TSH SERPL-ACNC: 2.04 MIU/L (ref 0.36–3.74)
UROBILINOGEN UR STRIP-ACNC: 2 MG/DL
VLDLC SERPL CALC-MCNC: 19 MG/DL
WBC #/AREA URNS AUTO: ABNORMAL /HPF

## 2020-01-29 ENCOUNTER — HISTORICAL (OUTPATIENT)
Dept: ADMINISTRATIVE | Facility: HOSPITAL | Age: 55
End: 2020-01-29

## 2020-01-29 LAB
ABS NEUT (OLG): 6.18 X10(3)/MCL (ref 2.1–9.2)
BASOPHILS # BLD AUTO: 0 X10(3)/MCL (ref 0–0.2)
BASOPHILS NFR BLD AUTO: 0 %
EOSINOPHIL # BLD AUTO: 0.1 X10(3)/MCL (ref 0–0.9)
EOSINOPHIL NFR BLD AUTO: 2 %
ERYTHROCYTE [DISTWIDTH] IN BLOOD BY AUTOMATED COUNT: 12.5 % (ref 11.5–14.5)
HCT VFR BLD AUTO: 43.4 % (ref 40–51)
HGB BLD-MCNC: 14.3 GM/DL (ref 13.5–17.5)
IMM GRANULOCYTES # BLD AUTO: 0.02 10*3/UL
IMM GRANULOCYTES NFR BLD AUTO: 0 %
LYMPHOCYTES # BLD AUTO: 0.6 X10(3)/MCL (ref 0.6–4.6)
LYMPHOCYTES NFR BLD AUTO: 8 %
MCH RBC QN AUTO: 31.4 PG (ref 26–34)
MCHC RBC AUTO-ENTMCNC: 32.9 GM/DL (ref 31–37)
MCV RBC AUTO: 95.2 FL (ref 80–100)
MONOCYTES # BLD AUTO: 1 X10(3)/MCL (ref 0.1–1.3)
MONOCYTES NFR BLD AUTO: 12 %
NEUTROPHILS # BLD AUTO: 6.18 X10(3)/MCL (ref 2.1–9.2)
NEUTROPHILS NFR BLD AUTO: 77 %
PLATELET # BLD AUTO: 146 X10(3)/MCL (ref 130–400)
PMV BLD AUTO: 10.5 FL (ref 7.4–10.4)
RBC # BLD AUTO: 4.56 X10(6)/MCL (ref 4.5–5.9)
WBC # SPEC AUTO: 8 X10(3)/MCL (ref 4.5–11)

## 2020-08-04 ENCOUNTER — HISTORICAL (OUTPATIENT)
Dept: INTERNAL MEDICINE | Facility: CLINIC | Age: 55
End: 2020-08-04

## 2020-08-04 LAB
ABS NEUT (OLG): 4.2 X10(3)/MCL (ref 2.1–9.2)
ALBUMIN SERPL-MCNC: 3.6 GM/DL (ref 3.4–5)
ALBUMIN/GLOB SERPL: 0.9 RATIO (ref 1.1–2)
ALP SERPL-CCNC: 94 UNIT/L (ref 45–117)
ALT SERPL-CCNC: 32 UNIT/L (ref 12–78)
AST SERPL-CCNC: 20 UNIT/L (ref 15–37)
BASOPHILS # BLD AUTO: 0 X10(3)/MCL (ref 0–0.2)
BASOPHILS NFR BLD AUTO: 0 %
BILIRUB SERPL-MCNC: 0.3 MG/DL (ref 0.2–1)
BILIRUBIN DIRECT+TOT PNL SERPL-MCNC: <0.1 MG/DL (ref 0–0.2)
BILIRUBIN DIRECT+TOT PNL SERPL-MCNC: ABNORMAL MG/DL
BUN SERPL-MCNC: 16 MG/DL (ref 7–18)
CALCIUM SERPL-MCNC: 8.9 MG/DL (ref 8.5–10.1)
CD3+CD4+ CELLS # SPEC: 1583 UNIT/L (ref 589–1505)
CD3+CD4+ CELLS NFR BLD: 49.6 % (ref 31–59)
CHLORIDE SERPL-SCNC: 108 MMOL/L (ref 98–107)
CO2 SERPL-SCNC: 27 MMOL/L (ref 21–32)
CREAT SERPL-MCNC: 1.2 MG/DL (ref 0.6–1.3)
EOSINOPHIL # BLD AUTO: 0.2 X10(3)/MCL (ref 0–0.9)
EOSINOPHIL NFR BLD AUTO: 3 %
ERYTHROCYTE [DISTWIDTH] IN BLOOD BY AUTOMATED COUNT: 12.3 % (ref 11.5–14.5)
GLOBULIN SER-MCNC: 3.8 GM/ML (ref 2.3–3.5)
GLUCOSE SERPL-MCNC: 103 MG/DL (ref 74–106)
HCT VFR BLD AUTO: 41.4 % (ref 40–51)
HGB BLD-MCNC: 13.9 GM/DL (ref 13.5–17.5)
IMM GRANULOCYTES # BLD AUTO: 0.01 10*3/UL
IMM GRANULOCYTES NFR BLD AUTO: 0 %
LYMPHOCYTES # BLD AUTO: 3.2 X10(3)/MCL (ref 0.6–4.6)
LYMPHOCYTES # BLD AUTO: 3192 UNIT/L (ref 1260–5520)
LYMPHOCYTES NFR BLD AUTO: 38 %
LYMPHOCYTES NFR LN MANUAL: 38 % (ref 28–48)
LYMPHOMA - T-CELL MARKERS SPEC-IMP: ABNORMAL
MCH RBC QN AUTO: 32 PG (ref 26–34)
MCHC RBC AUTO-ENTMCNC: 33.6 GM/DL (ref 31–37)
MCV RBC AUTO: 95.4 FL (ref 80–100)
MONOCYTES # BLD AUTO: 0.7 X10(3)/MCL (ref 0.1–1.3)
MONOCYTES NFR BLD AUTO: 9 %
NEUTROPHILS # BLD AUTO: 4.2 X10(3)/MCL (ref 2.1–9.2)
NEUTROPHILS NFR BLD AUTO: 50 %
PLATELET # BLD AUTO: 151 X10(3)/MCL (ref 130–400)
PMV BLD AUTO: 10.4 FL (ref 7.4–10.4)
POTASSIUM SERPL-SCNC: 3.7 MMOL/L (ref 3.5–5.1)
PROT SERPL-MCNC: 7.4 GM/DL (ref 6.4–8.2)
RBC # BLD AUTO: 4.34 X10(6)/MCL (ref 4.5–5.9)
SODIUM SERPL-SCNC: 140 MMOL/L (ref 136–145)
WBC # BLD AUTO: 8400 /MM3 (ref 4500–11500)
WBC # SPEC AUTO: 8.4 X10(3)/MCL (ref 4.5–11)

## 2020-09-16 ENCOUNTER — HISTORICAL (OUTPATIENT)
Dept: INTERNAL MEDICINE | Facility: CLINIC | Age: 55
End: 2020-09-16

## 2020-09-16 LAB
BUN SERPL-MCNC: 17 MG/DL (ref 7–18)
CALCIUM SERPL-MCNC: 9 MG/DL (ref 8.5–10.1)
CHLORIDE SERPL-SCNC: 106 MMOL/L (ref 98–107)
CO2 SERPL-SCNC: 32 MMOL/L (ref 21–32)
CREAT SERPL-MCNC: 1.3 MG/DL (ref 0.6–1.3)
CREAT/UREA NIT SERPL: 13 MG/DL (ref 12–14)
EST. AVERAGE GLUCOSE BLD GHB EST-MCNC: 128 MG/DL
GLUCOSE SERPL-MCNC: 125 MG/DL (ref 74–106)
HBA1C MFR BLD: 6.1 % (ref 4.2–6.3)
POTASSIUM SERPL-SCNC: 4.1 MMOL/L (ref 3.5–5.1)
SODIUM SERPL-SCNC: 140 MMOL/L (ref 136–145)

## 2020-11-03 ENCOUNTER — HISTORICAL (OUTPATIENT)
Dept: INTERNAL MEDICINE | Facility: CLINIC | Age: 55
End: 2020-11-03

## 2020-11-03 LAB
ABS NEUT (OLG): 3.28 X10(3)/MCL (ref 2.1–9.2)
BASOPHILS # BLD AUTO: 0 X10(3)/MCL (ref 0–0.2)
BASOPHILS NFR BLD AUTO: 0 %
BUN SERPL-MCNC: 14 MG/DL (ref 8.4–25.7)
CALCIUM SERPL-MCNC: 9.1 MG/DL (ref 8.4–10.2)
CHLORIDE SERPL-SCNC: 104 MMOL/L (ref 98–107)
CO2 SERPL-SCNC: 29 MMOL/L (ref 22–29)
CREAT SERPL-MCNC: 1.23 MG/DL (ref 0.73–1.18)
CREAT/UREA NIT SERPL: 11
DEPRECATED CALCIDIOL+CALCIFEROL SERPL-MC: 11.6 NG/ML (ref 30–80)
EOSINOPHIL # BLD AUTO: 0.2 X10(3)/MCL (ref 0–0.9)
EOSINOPHIL NFR BLD AUTO: 3 %
ERYTHROCYTE [DISTWIDTH] IN BLOOD BY AUTOMATED COUNT: 12.2 % (ref 11.5–14.5)
GLUCOSE SERPL-MCNC: 98 MG/DL (ref 74–100)
HCT VFR BLD AUTO: 43 % (ref 40–51)
HGB BLD-MCNC: 14.2 GM/DL (ref 13.5–17.5)
IMM GRANULOCYTES # BLD AUTO: 0.01 10*3/UL
IMM GRANULOCYTES NFR BLD AUTO: 0 %
LYMPHOCYTES # BLD AUTO: 2.8 X10(3)/MCL (ref 0.6–4.6)
LYMPHOCYTES NFR BLD AUTO: 40 %
MCH RBC QN AUTO: 31.8 PG (ref 26–34)
MCHC RBC AUTO-ENTMCNC: 33 GM/DL (ref 31–37)
MCV RBC AUTO: 96.4 FL (ref 80–100)
MONOCYTES # BLD AUTO: 0.7 X10(3)/MCL (ref 0.1–1.3)
MONOCYTES NFR BLD AUTO: 10 %
NEUTROPHILS # BLD AUTO: 3.28 X10(3)/MCL (ref 2.1–9.2)
NEUTROPHILS NFR BLD AUTO: 46 %
PLATELET # BLD AUTO: 150 X10(3)/MCL (ref 130–400)
PMV BLD AUTO: 11.1 FL (ref 7.4–10.4)
POTASSIUM SERPL-SCNC: 4 MMOL/L (ref 3.5–5.1)
RBC # BLD AUTO: 4.46 X10(6)/MCL (ref 4.5–5.9)
SODIUM SERPL-SCNC: 139 MMOL/L (ref 136–145)
WBC # SPEC AUTO: 7.1 X10(3)/MCL (ref 4.5–11)

## 2021-03-22 ENCOUNTER — HISTORICAL (OUTPATIENT)
Dept: ADMINISTRATIVE | Facility: HOSPITAL | Age: 56
End: 2021-03-22

## 2021-03-22 LAB
ABS NEUT (OLG): 2.87 X10(3)/MCL (ref 2.1–9.2)
ALBUMIN SERPL-MCNC: 3.9 GM/DL (ref 3.5–5)
ALBUMIN/GLOB SERPL: 1.1 RATIO (ref 1.1–2)
ALP SERPL-CCNC: 93 UNIT/L (ref 40–150)
ALT SERPL-CCNC: 29 UNIT/L (ref 0–55)
APPEARANCE, UA: ABNORMAL
AST SERPL-CCNC: 25 UNIT/L (ref 5–34)
BACTERIA #/AREA URNS AUTO: ABNORMAL /HPF
BASOPHILS # BLD AUTO: 0 X10(3)/MCL (ref 0–0.2)
BASOPHILS NFR BLD AUTO: 0 %
BILIRUB SERPL-MCNC: 0.4 MG/DL
BILIRUB UR QL STRIP: NEGATIVE
BILIRUBIN DIRECT+TOT PNL SERPL-MCNC: 0.2 MG/DL (ref 0–0.5)
BILIRUBIN DIRECT+TOT PNL SERPL-MCNC: 0.2 MG/DL (ref 0–0.8)
BUN SERPL-MCNC: 13.3 MG/DL (ref 8.4–25.7)
CALCIUM SERPL-MCNC: 9.2 MG/DL (ref 8.4–10.2)
CD3+CD4+ CELLS # SPEC: 1001 UNIT/L (ref 589–1505)
CD3+CD4+ CELLS NFR BLD: 45.4 % (ref 31–59)
CHLORIDE SERPL-SCNC: 99 MMOL/L (ref 98–107)
CHOLEST SERPL-MCNC: 196 MG/DL
CHOLEST/HDLC SERPL: 5 {RATIO} (ref 0–5)
CO2 SERPL-SCNC: 29 MMOL/L (ref 22–29)
COLOR UR: YELLOW
CREAT SERPL-MCNC: 1.24 MG/DL (ref 0.73–1.18)
DEPRECATED CALCIDIOL+CALCIFEROL SERPL-MC: 23.2 NG/ML (ref 30–80)
EOSINOPHIL # BLD AUTO: 0.2 X10(3)/MCL (ref 0–0.9)
EOSINOPHIL NFR BLD AUTO: 3 %
ERYTHROCYTE [DISTWIDTH] IN BLOOD BY AUTOMATED COUNT: 12.6 % (ref 11.5–14.5)
EST. AVERAGE GLUCOSE BLD GHB EST-MCNC: 116.9 MG/DL
GLOBULIN SER-MCNC: 3.7 GM/DL (ref 2.4–3.5)
GLUCOSE (UA): NEGATIVE
GLUCOSE SERPL-MCNC: 122 MG/DL (ref 74–100)
HBA1C MFR BLD: 5.7 %
HCT VFR BLD AUTO: 44 % (ref 40–51)
HCV AB SERPL QL IA: NONREACTIVE
HDLC SERPL-MCNC: 41 MG/DL (ref 35–60)
HGB BLD-MCNC: 14.5 GM/DL (ref 13.5–17.5)
HGB UR QL STRIP: NEGATIVE
HYALINE CASTS #/AREA URNS LPF: ABNORMAL /LPF
IMM GRANULOCYTES # BLD AUTO: 0.01 10*3/UL
IMM GRANULOCYTES NFR BLD AUTO: 0 %
KETONES UR QL STRIP: NEGATIVE
LDLC SERPL CALC-MCNC: 135 MG/DL (ref 50–140)
LEUKOCYTE ESTERASE UR QL STRIP: NEGATIVE
LYMPHOCYTES # BLD AUTO: 2.2 X10(3)/MCL (ref 0.6–4.6)
LYMPHOCYTES # BLD AUTO: 2204 UNIT/L (ref 1260–5520)
LYMPHOCYTES NFR BLD AUTO: 38 %
LYMPHOCYTES NFR LN MANUAL: 38 % (ref 28–48)
LYMPHOMA - T-CELL MARKERS SPEC-IMP: NORMAL
MCH RBC QN AUTO: 31.7 PG (ref 26–34)
MCHC RBC AUTO-ENTMCNC: 33 GM/DL (ref 31–37)
MCV RBC AUTO: 96.3 FL (ref 80–100)
MONOCYTES # BLD AUTO: 0.5 X10(3)/MCL (ref 0.1–1.3)
MONOCYTES NFR BLD AUTO: 8 %
NEG CONT SPOT COUNT: NORMAL
NEUTROPHILS # BLD AUTO: 2.87 X10(3)/MCL (ref 2.1–9.2)
NEUTROPHILS NFR BLD AUTO: 50 %
NITRITE UR QL STRIP: NEGATIVE
PANEL A SPOT COUNT: 1
PANEL B SPOT COUNT: 0
PH UR STRIP: 5.5 [PH] (ref 4.5–8)
PLATELET # BLD AUTO: 161 X10(3)/MCL (ref 130–400)
PMV BLD AUTO: 10.6 FL (ref 7.4–10.4)
POS CONT SPOT COUNT: NORMAL
POTASSIUM SERPL-SCNC: 3.8 MMOL/L (ref 3.5–5.1)
PROT SERPL-MCNC: 7.6 GM/DL (ref 6.4–8.3)
PROT UR QL STRIP: 10 MG/DL
RBC # BLD AUTO: 4.57 X10(6)/MCL (ref 4.5–5.9)
RBC #/AREA URNS AUTO: ABNORMAL /HPF
SODIUM SERPL-SCNC: 135 MMOL/L (ref 136–145)
SP GR UR STRIP: 1.02 (ref 1–1.03)
SQUAMOUS #/AREA URNS LPF: ABNORMAL /LPF
T PALLIDUM AB SER QL: NONREACTIVE
T-SPOT.TB: NORMAL
TRIGL SERPL-MCNC: 99 MG/DL (ref 34–140)
TSH SERPL-ACNC: 1.48 UIU/ML (ref 0.35–4.94)
UROBILINOGEN UR STRIP-ACNC: NORMAL
VLDLC SERPL CALC-MCNC: 20 MG/DL
WBC # BLD AUTO: 5800 /MM3 (ref 4500–11500)
WBC # SPEC AUTO: 5.8 X10(3)/MCL (ref 4.5–11)
WBC #/AREA URNS AUTO: ABNORMAL /HPF

## 2021-10-20 ENCOUNTER — HISTORICAL (OUTPATIENT)
Dept: ADMINISTRATIVE | Facility: HOSPITAL | Age: 56
End: 2021-10-20

## 2021-10-20 LAB
ABS NEUT (OLG): 2.99 X10(3)/MCL (ref 2.1–9.2)
ALBUMIN SERPL-MCNC: 3.8 GM/DL (ref 3.5–5)
ALBUMIN/GLOB SERPL: 1.1 RATIO (ref 1.1–2)
ALP SERPL-CCNC: 108 UNIT/L (ref 40–150)
ALT SERPL-CCNC: 19 UNIT/L (ref 0–55)
AST SERPL-CCNC: 23 UNIT/L (ref 5–34)
BASOPHILS # BLD AUTO: 0 X10(3)/MCL (ref 0–0.2)
BASOPHILS NFR BLD AUTO: 0 %
BILIRUB SERPL-MCNC: 0.2 MG/DL
BILIRUBIN DIRECT+TOT PNL SERPL-MCNC: 0.1 MG/DL (ref 0–0.5)
BILIRUBIN DIRECT+TOT PNL SERPL-MCNC: 0.1 MG/DL (ref 0–0.8)
BUN SERPL-MCNC: 11.2 MG/DL (ref 8.4–25.7)
CALCIUM SERPL-MCNC: 9.5 MG/DL (ref 8.4–10.2)
CD3+CD4+ CELLS # SPEC: 1627 UNIT/L (ref 589–1505)
CD3+CD4+ CELLS NFR BLD: 55 % (ref 31–59)
CHLORIDE SERPL-SCNC: 104 MMOL/L (ref 98–107)
CO2 SERPL-SCNC: 28 MMOL/L (ref 22–29)
CREAT SERPL-MCNC: 1.12 MG/DL (ref 0.73–1.18)
EOSINOPHIL # BLD AUTO: 0.2 X10(3)/MCL (ref 0–0.9)
EOSINOPHIL NFR BLD AUTO: 3 %
ERYTHROCYTE [DISTWIDTH] IN BLOOD BY AUTOMATED COUNT: 12.6 % (ref 11.5–14.5)
GLOBULIN SER-MCNC: 3.5 GM/DL (ref 2.4–3.5)
GLUCOSE SERPL-MCNC: 115 MG/DL (ref 74–100)
HCT VFR BLD AUTO: 41.5 % (ref 40–51)
HGB BLD-MCNC: 14.1 GM/DL (ref 13.5–17.5)
IMM GRANULOCYTES # BLD AUTO: 0.01 10*3/UL
IMM GRANULOCYTES NFR BLD AUTO: 0 %
LYMPHOCYTES # BLD AUTO: 3.4 X10(3)/MCL (ref 0.6–4.6)
LYMPHOCYTES # BLD AUTO: NORMAL UNIT/L (ref 1260–5520)
LYMPHOCYTES NFR BLD AUTO: 47 %
LYMPHOCYTES NFR LN MANUAL: NORMAL % (ref 28–48)
MCH RBC QN AUTO: 31.3 PG (ref 26–34)
MCHC RBC AUTO-ENTMCNC: 34 GM/DL (ref 31–37)
MCV RBC AUTO: 92 FL (ref 80–100)
MONOCYTES # BLD AUTO: 0.6 X10(3)/MCL (ref 0.1–1.3)
MONOCYTES NFR BLD AUTO: 8 %
NEUTROPHILS # BLD AUTO: 2.99 X10(3)/MCL (ref 2.1–9.2)
NEUTROPHILS NFR BLD AUTO: 42 %
NRBC BLD AUTO-RTO: 0 % (ref 0–0.2)
PLATELET # BLD AUTO: 157 X10(3)/MCL (ref 130–400)
PMV BLD AUTO: 10.5 FL (ref 7.4–10.4)
POTASSIUM SERPL-SCNC: 3.8 MMOL/L (ref 3.5–5.1)
PROT SERPL-MCNC: 7.3 GM/DL (ref 6.4–8.3)
RBC # BLD AUTO: 4.51 X10(6)/MCL (ref 4.5–5.9)
SODIUM SERPL-SCNC: 137 MMOL/L (ref 136–145)
T PALLIDUM AB SER QL: NONREACTIVE
WBC # BLD AUTO: NORMAL /MM3 (ref 4500–11500)
WBC # SPEC AUTO: 7.2 X10(3)/MCL (ref 4.5–11)

## 2022-01-11 ENCOUNTER — HISTORICAL (OUTPATIENT)
Dept: LAB | Facility: HOSPITAL | Age: 57
End: 2022-01-11

## 2022-01-11 LAB
FLUAV AG UPPER RESP QL IA.RAPID: NEGATIVE
FLUBV AG UPPER RESP QL IA.RAPID: NEGATIVE
SARS-COV-2 RNA RESP QL NAA+PROBE: DETECTED

## 2022-04-11 ENCOUNTER — HISTORICAL (OUTPATIENT)
Dept: ADMINISTRATIVE | Facility: HOSPITAL | Age: 57
End: 2022-04-11
Payer: COMMERCIAL

## 2022-04-28 VITALS
OXYGEN SATURATION: 97 % | DIASTOLIC BLOOD PRESSURE: 86 MMHG | HEIGHT: 71 IN | WEIGHT: 228.38 LBS | BODY MASS INDEX: 31.97 KG/M2 | SYSTOLIC BLOOD PRESSURE: 135 MMHG

## 2022-06-08 DIAGNOSIS — B20 HIV DISEASE: Primary | ICD-10-CM

## 2022-06-08 RX ORDER — ABACAVIR SULFATE, DOLUTEGRAVIR SODIUM, LAMIVUDINE 600; 50; 300 MG/1; MG/1; MG/1
1 TABLET, FILM COATED ORAL DAILY
COMMUNITY
Start: 2022-05-09 | End: 2022-06-08 | Stop reason: SDUPTHER

## 2022-06-08 RX ORDER — SERTRALINE HYDROCHLORIDE 100 MG/1
150 TABLET, FILM COATED ORAL EVERY MORNING
COMMUNITY
Start: 2022-03-08 | End: 2022-11-15

## 2022-06-08 RX ORDER — OMEPRAZOLE 20 MG/1
20 CAPSULE, DELAYED RELEASE ORAL DAILY
COMMUNITY
Start: 2022-05-09 | End: 2022-08-30

## 2022-06-09 RX ORDER — ABACAVIR SULFATE, DOLUTEGRAVIR SODIUM, LAMIVUDINE 600; 50; 300 MG/1; MG/1; MG/1
1 TABLET, FILM COATED ORAL DAILY
Qty: 30 TABLET | Refills: 0 | Status: SHIPPED | OUTPATIENT
Start: 2022-06-09 | End: 2022-07-14

## 2022-07-06 ENCOUNTER — TELEPHONE (OUTPATIENT)
Dept: INFECTIOUS DISEASES | Facility: CLINIC | Age: 57
End: 2022-07-06
Payer: COMMERCIAL

## 2022-07-06 DIAGNOSIS — B20 HIV DISEASE: Primary | ICD-10-CM

## 2022-07-06 NOTE — TELEPHONE ENCOUNTER
Refill request for triumeq received from [facility]. I called and explained to pt that he has not has not been seen since 10/26/2021 nor has he had labs since 10/20/2021 so no refills can be authorized at this time. I informed pt he is scheduled for 8/4/2022 but will need to come for labs prior to refill authorizations. Pt verbalizes understanding and will come to [facility] for labs.  Lab orders placed    Refill denial faxed to [facility] and pt is aware he needs to contact ID clinic once labs are completed so that refills can be authorized. Pt stated he still has 1 week of triumeq

## 2022-07-14 ENCOUNTER — LAB VISIT (OUTPATIENT)
Dept: LAB | Facility: HOSPITAL | Age: 57
End: 2022-07-14
Attending: NURSE PRACTITIONER
Payer: COMMERCIAL

## 2022-07-14 DIAGNOSIS — B20 HIV DISEASE: ICD-10-CM

## 2022-07-14 LAB
ALBUMIN SERPL-MCNC: 3.8 GM/DL (ref 3.5–5)
ALBUMIN/GLOB SERPL: 1 RATIO (ref 1.1–2)
ALP SERPL-CCNC: 134 UNIT/L (ref 40–150)
ALT SERPL-CCNC: 33 UNIT/L (ref 0–55)
APPEARANCE UR: CLEAR
AST SERPL-CCNC: 27 UNIT/L (ref 5–34)
BACTERIA #/AREA URNS AUTO: ABNORMAL /HPF
BASOPHILS # BLD AUTO: 0.03 X10(3)/MCL (ref 0–0.2)
BASOPHILS NFR BLD AUTO: 0.4 %
BILIRUB UR QL STRIP.AUTO: NEGATIVE MG/DL
BILIRUBIN DIRECT+TOT PNL SERPL-MCNC: 0.3 MG/DL
BUN SERPL-MCNC: 12.7 MG/DL (ref 8.4–25.7)
C TRACH DNA SPEC QL NAA+PROBE: NOT DETECTED
CALCIUM SERPL-MCNC: 9.6 MG/DL (ref 8.4–10.2)
CHLORIDE SERPL-SCNC: 107 MMOL/L (ref 98–107)
CHOLEST SERPL-MCNC: 212 MG/DL
CHOLEST/HDLC SERPL: 5 {RATIO} (ref 0–5)
CO2 SERPL-SCNC: 28 MMOL/L (ref 22–29)
COLOR UR AUTO: ABNORMAL
CREAT SERPL-MCNC: 1.33 MG/DL (ref 0.73–1.18)
DEPRECATED CALCIDIOL+CALCIFEROL SERPL-MC: 14 NG/ML (ref 30–80)
EOSINOPHIL # BLD AUTO: 0.22 X10(3)/MCL (ref 0–0.9)
EOSINOPHIL NFR BLD AUTO: 3.2 %
ERYTHROCYTE [DISTWIDTH] IN BLOOD BY AUTOMATED COUNT: 12.6 % (ref 11.5–17)
EST. AVERAGE GLUCOSE BLD GHB EST-MCNC: 116.9 MG/DL
GLOBULIN SER-MCNC: 3.7 GM/DL (ref 2.4–3.5)
GLUCOSE SERPL-MCNC: 96 MG/DL (ref 74–100)
GLUCOSE UR QL STRIP.AUTO: NORMAL MG/DL
HBA1C MFR BLD: 5.7 %
HCT VFR BLD AUTO: 43.6 % (ref 42–52)
HCV AB SERPL QL IA: NONREACTIVE
HDLC SERPL-MCNC: 43 MG/DL (ref 35–60)
HGB BLD-MCNC: 14.4 GM/DL (ref 14–18)
HYALINE CASTS #/AREA URNS LPF: ABNORMAL /LPF
IMM GRANULOCYTES # BLD AUTO: 0.01 X10(3)/MCL (ref 0–0.04)
IMM GRANULOCYTES NFR BLD AUTO: 0.1 %
KETONES UR QL STRIP.AUTO: NEGATIVE MG/DL
LDLC SERPL CALC-MCNC: 136 MG/DL (ref 50–140)
LEUKOCYTE ESTERASE UR QL STRIP.AUTO: NEGATIVE UNIT/L
LYMPHOCYTES # BLD AUTO: 3.07 X10(3)/MCL (ref 0.6–4.6)
LYMPHOCYTES NFR BLD AUTO: 44.4 %
MCH RBC QN AUTO: 31 PG (ref 27–31)
MCHC RBC AUTO-ENTMCNC: 33 MG/DL (ref 33–36)
MCV RBC AUTO: 94 FL (ref 80–94)
MONOCYTES # BLD AUTO: 0.58 X10(3)/MCL (ref 0.1–1.3)
MONOCYTES NFR BLD AUTO: 8.4 %
MUCOUS THREADS URNS QL MICRO: ABNORMAL /LPF
N GONORRHOEA DNA SPEC QL NAA+PROBE: NOT DETECTED
NEUTROPHILS # BLD AUTO: 3 X10(3)/MCL (ref 2.1–9.2)
NEUTROPHILS NFR BLD AUTO: 43.5 %
NITRITE UR QL STRIP.AUTO: NEGATIVE
NRBC BLD AUTO-RTO: 0 %
PH UR STRIP.AUTO: 6 [PH]
PLATELET # BLD AUTO: 162 X10(3)/MCL (ref 130–400)
PMV BLD AUTO: 10.7 FL (ref 7.4–10.4)
POTASSIUM SERPL-SCNC: 4.8 MMOL/L (ref 3.5–5.1)
PROT SERPL-MCNC: 7.5 GM/DL (ref 6.4–8.3)
PROT UR QL STRIP.AUTO: NEGATIVE MG/DL
RBC # BLD AUTO: 4.64 X10(6)/MCL (ref 4.7–6.1)
RBC #/AREA URNS AUTO: ABNORMAL /HPF
RBC UR QL AUTO: NEGATIVE UNIT/L
SODIUM SERPL-SCNC: 143 MMOL/L (ref 136–145)
SP GR UR STRIP.AUTO: 1.02
SQUAMOUS #/AREA URNS LPF: ABNORMAL /HPF
T PALLIDUM AB SER QL: NONREACTIVE
TRIGL SERPL-MCNC: 163 MG/DL (ref 34–140)
TSH SERPL-ACNC: 1.45 UIU/ML (ref 0.35–4.94)
UROBILINOGEN UR STRIP-ACNC: NORMAL MG/DL
VLDLC SERPL CALC-MCNC: 33 MG/DL
WBC # SPEC AUTO: 6.9 X10(3)/MCL (ref 4.5–11.5)
WBC #/AREA URNS AUTO: ABNORMAL /HPF

## 2022-07-14 PROCEDURE — 81001 URINALYSIS AUTO W/SCOPE: CPT

## 2022-07-14 PROCEDURE — 85025 COMPLETE CBC W/AUTO DIFF WBC: CPT

## 2022-07-14 PROCEDURE — 36415 COLL VENOUS BLD VENIPUNCTURE: CPT

## 2022-07-14 PROCEDURE — 82306 VITAMIN D 25 HYDROXY: CPT

## 2022-07-14 PROCEDURE — 84443 ASSAY THYROID STIM HORMONE: CPT

## 2022-07-14 PROCEDURE — 83036 HEMOGLOBIN GLYCOSYLATED A1C: CPT

## 2022-07-14 PROCEDURE — 87536 HIV-1 QUANT&REVRSE TRNSCRPJ: CPT

## 2022-07-14 PROCEDURE — 87591 N.GONORRHOEAE DNA AMP PROB: CPT

## 2022-07-14 PROCEDURE — 86780 TREPONEMA PALLIDUM: CPT

## 2022-07-14 PROCEDURE — 86480 TB TEST CELL IMMUN MEASURE: CPT

## 2022-07-14 PROCEDURE — 80061 LIPID PANEL: CPT

## 2022-07-14 PROCEDURE — 87491 CHLMYD TRACH DNA AMP PROBE: CPT

## 2022-07-14 PROCEDURE — 80053 COMPREHEN METABOLIC PANEL: CPT

## 2022-07-14 PROCEDURE — 86361 T CELL ABSOLUTE COUNT: CPT

## 2022-07-14 PROCEDURE — 86803 HEPATITIS C AB TEST: CPT

## 2022-07-14 RX ORDER — ABACAVIR SULFATE, DOLUTEGRAVIR SODIUM, LAMIVUDINE 600; 50; 300 MG/1; MG/1; MG/1
1 TABLET, FILM COATED ORAL DAILY
Qty: 30 TABLET | Refills: 0 | Status: SHIPPED | OUTPATIENT
Start: 2022-07-14 | End: 2022-08-30 | Stop reason: SDUPTHER

## 2022-07-15 LAB — HIV1 RNA # PLAS NAA DL=20: NORMAL COPIES/ML

## 2022-07-18 LAB
GAMMA INTERFERON BACKGROUND BLD IA-ACNC: 0.02 IU/ML
M TB IFN-G BLD-IMP: NEGATIVE
M TB IFN-G CD4+ BCKGRND COR BLD-ACNC: 0.06 IU/ML
M TB IFN-G CD4+CD8+ BCKGRND COR BLD-ACNC: 0 IU/ML
MITOGEN IGNF BCKGRD COR BLD-ACNC: >10 IU/ML

## 2022-07-22 LAB
AGE: 58
CD3+CD4+ CELLS # BLD: 44.4 % (ref 28–48)
CD3+CD4+ CELLS # SPEC: 1623.71 UNIT/L (ref 589–1505)
CD3+CD4+ CELLS NFR BLD: 53 %
LYMPHOCYTES # BLD AUTO: 3063.6 X10(3)/MCL (ref 1260–5520)
LYMPHOMA - T-CELL MARKERS SPEC-IMP: ABNORMAL
WBC # BLD AUTO: 6900 /MM3 (ref 4500–11500)

## 2022-08-11 ENCOUNTER — TELEPHONE (OUTPATIENT)
Dept: ENDOSCOPY | Facility: HOSPITAL | Age: 57
End: 2022-08-11
Payer: COMMERCIAL

## 2022-08-11 DIAGNOSIS — B20 HIV DISEASE: ICD-10-CM

## 2022-08-11 RX ORDER — ABACAVIR SULFATE, DOLUTEGRAVIR SODIUM, LAMIVUDINE 600; 50; 300 MG/1; MG/1; MG/1
1 TABLET, FILM COATED ORAL DAILY
Qty: 30 TABLET | Refills: 0 | OUTPATIENT
Start: 2022-08-11

## 2022-08-11 NOTE — TELEPHONE ENCOUNTER
LV10/26/2021  Last labs 7/14/2022  No show on 8/4/2022, no future visit scheduled  Message sent to  to contact pt for appt

## 2022-08-29 ENCOUNTER — TELEPHONE (OUTPATIENT)
Dept: INFECTIOUS DISEASES | Facility: CLINIC | Age: 57
End: 2022-08-29
Payer: COMMERCIAL

## 2022-08-29 NOTE — TELEPHONE ENCOUNTER
----- Message from Pilar Bob sent at 8/29/2022  1:17 PM CDT -----  Regarding: [facility] ID: Tanner KATZ PT       Pt needs refills on Triumeq.   [facility] in [place]  Pt # [phone]

## 2022-08-29 NOTE — TELEPHONE ENCOUNTER
I called and spoke with pt and informed him that Katelyn has denied his medications due to pt no coming for visit since 10/26/2021. Pt was informed a few weeks ago that he needed to come on 8/5/2022 for his appt in order to get refills, but he was a no show. I gave pt an appt for tomorrow 8/30/2022 @ 8:30 for appt and explained to pt that if he comes for appt he will be able to get refills. Pt was upset because he thinks it is ridiculously stupid he has to see a dr for medication but stated he will try to come to visit on 8/30/2022

## 2022-08-30 ENCOUNTER — OFFICE VISIT (OUTPATIENT)
Dept: INFECTIOUS DISEASES | Facility: CLINIC | Age: 57
End: 2022-08-30
Payer: COMMERCIAL

## 2022-08-30 ENCOUNTER — TELEPHONE (OUTPATIENT)
Dept: INFECTIOUS DISEASES | Facility: CLINIC | Age: 57
End: 2022-08-30

## 2022-08-30 VITALS
WEIGHT: 226.5 LBS | RESPIRATION RATE: 16 BRPM | HEIGHT: 70 IN | HEART RATE: 75 BPM | DIASTOLIC BLOOD PRESSURE: 86 MMHG | TEMPERATURE: 98 F | BODY MASS INDEX: 32.43 KG/M2 | SYSTOLIC BLOOD PRESSURE: 134 MMHG

## 2022-08-30 DIAGNOSIS — E55.9 VITAMIN D DEFICIENCY: ICD-10-CM

## 2022-08-30 DIAGNOSIS — Z23 NEED FOR VACCINATION: ICD-10-CM

## 2022-08-30 DIAGNOSIS — M85.80 OSTEOPENIA, UNSPECIFIED LOCATION: ICD-10-CM

## 2022-08-30 DIAGNOSIS — B20 HIV DISEASE: Primary | ICD-10-CM

## 2022-08-30 DIAGNOSIS — Z80.0 FAMILY HISTORY OF COLON CANCER IN MOTHER: ICD-10-CM

## 2022-08-30 DIAGNOSIS — R79.89 ELEVATED SERUM CREATININE: ICD-10-CM

## 2022-08-30 DIAGNOSIS — Z86.010 HISTORY OF COLON POLYPS: ICD-10-CM

## 2022-08-30 LAB
ANION GAP SERPL CALC-SCNC: 7 MEQ/L
BUN SERPL-MCNC: 16.7 MG/DL (ref 8.4–25.7)
CALCIUM SERPL-MCNC: 9.4 MG/DL (ref 8.4–10.2)
CHLORIDE SERPL-SCNC: 104 MMOL/L (ref 98–107)
CO2 SERPL-SCNC: 27 MMOL/L (ref 22–29)
CREAT SERPL-MCNC: 1.05 MG/DL (ref 0.73–1.18)
CREAT/UREA NIT SERPL: 16
GFR SERPLBLD CREATININE-BSD FMLA CKD-EPI: >60 MLS/MIN/1.73/M2
GLUCOSE SERPL-MCNC: 96 MG/DL (ref 74–100)
POTASSIUM SERPL-SCNC: 3.9 MMOL/L (ref 3.5–5.1)
SODIUM SERPL-SCNC: 138 MMOL/L (ref 136–145)

## 2022-08-30 PROCEDURE — 3079F PR MOST RECENT DIASTOLIC BLOOD PRESSURE 80-89 MM HG: ICD-10-PCS | Mod: CPTII,,, | Performed by: NURSE PRACTITIONER

## 2022-08-30 PROCEDURE — 3079F DIAST BP 80-89 MM HG: CPT | Mod: CPTII,,, | Performed by: NURSE PRACTITIONER

## 2022-08-30 PROCEDURE — 99215 PR OFFICE/OUTPT VISIT, EST, LEVL V, 40-54 MIN: ICD-10-PCS | Mod: S$PBB,,, | Performed by: NURSE PRACTITIONER

## 2022-08-30 PROCEDURE — 80048 BASIC METABOLIC PNL TOTAL CA: CPT | Performed by: NURSE PRACTITIONER

## 2022-08-30 PROCEDURE — 99215 OFFICE O/P EST HI 40 MIN: CPT | Mod: S$PBB,,, | Performed by: NURSE PRACTITIONER

## 2022-08-30 PROCEDURE — 36415 COLL VENOUS BLD VENIPUNCTURE: CPT | Performed by: NURSE PRACTITIONER

## 2022-08-30 PROCEDURE — 1160F PR REVIEW ALL MEDS BY PRESCRIBER/CLIN PHARMACIST DOCUMENTED: ICD-10-PCS | Mod: CPTII,,, | Performed by: NURSE PRACTITIONER

## 2022-08-30 PROCEDURE — 90750 HZV VACC RECOMBINANT IM: CPT | Mod: PBBFAC

## 2022-08-30 PROCEDURE — 3008F BODY MASS INDEX DOCD: CPT | Mod: CPTII,,, | Performed by: NURSE PRACTITIONER

## 2022-08-30 PROCEDURE — 3008F PR BODY MASS INDEX (BMI) DOCUMENTED: ICD-10-PCS | Mod: CPTII,,, | Performed by: NURSE PRACTITIONER

## 2022-08-30 PROCEDURE — 3075F SYST BP GE 130 - 139MM HG: CPT | Mod: CPTII,,, | Performed by: NURSE PRACTITIONER

## 2022-08-30 PROCEDURE — 1159F PR MEDICATION LIST DOCUMENTED IN MEDICAL RECORD: ICD-10-PCS | Mod: CPTII,,, | Performed by: NURSE PRACTITIONER

## 2022-08-30 PROCEDURE — 3075F PR MOST RECENT SYSTOLIC BLOOD PRESS GE 130-139MM HG: ICD-10-PCS | Mod: CPTII,,, | Performed by: NURSE PRACTITIONER

## 2022-08-30 PROCEDURE — 1160F RVW MEDS BY RX/DR IN RCRD: CPT | Mod: CPTII,,, | Performed by: NURSE PRACTITIONER

## 2022-08-30 PROCEDURE — 99214 OFFICE O/P EST MOD 30 MIN: CPT | Mod: PBBFAC,25 | Performed by: NURSE PRACTITIONER

## 2022-08-30 PROCEDURE — 1159F MED LIST DOCD IN RCRD: CPT | Mod: CPTII,,, | Performed by: NURSE PRACTITIONER

## 2022-08-30 RX ORDER — ERGOCALCIFEROL 1.25 MG/1
50000 CAPSULE ORAL
Qty: 5 CAPSULE | Refills: 4 | Status: SHIPPED | OUTPATIENT
Start: 2022-08-30 | End: 2023-02-17 | Stop reason: SDUPTHER

## 2022-08-30 RX ORDER — ABACAVIR SULFATE, DOLUTEGRAVIR SODIUM, LAMIVUDINE 600; 50; 300 MG/1; MG/1; MG/1
1 TABLET, FILM COATED ORAL DAILY
Qty: 30 TABLET | Refills: 0 | Status: SHIPPED | OUTPATIENT
Start: 2022-08-30 | End: 2022-09-22

## 2022-08-30 NOTE — TELEPHONE ENCOUNTER
----- Message from FLACO Pa sent at 8/30/2022  3:04 PM CDT -----  Lab results reviewed.  Please phone patient with results as requested.  No new orders at this time.

## 2022-08-30 NOTE — PROGRESS NOTES
Subjective:       Patient ID: Curry Bello is a 56 y.o. male.    Chief Complaint: Follow-up (b20)    8/30/22  Curry is a 55 yo AAM presenting today for HIV f/u visit.  He has been taking Triumeq daily as prescribed until running out 1 week ago due to missed appointments.  He is eager to resume treatment.  Last labs 7/14/22 VL UD, CD4 1623, creatinine 1.33.  He denies use of protein supplements but does take 2 naproxen daily for back pain.  He will try switching to acetaminophen products.  He has cut back on soda to 1 per day & drinks 6-8 bottles of water daily.  Will repeat BMP today.  He is , no new sexual partners.  Has never received any type of anal penetration, declines repeat anal pap.  He is past due for repeat colonoscopy.  Last c-scope with polypectomy 11/2016 by Dr. Magana, mother with history of colon cancer.  Referral order placed.  Pt agrees to attend.  Vitamin D level 14.  He tells me that he is taking OTC supplement when he remembers, about once a week.  Amenable to prescription for same with weekly dosing.  Last DEXA 2/16, order placed for repeat.  Appreciates Shingrix vaccination today.  Scheduled for eye exam next month, plans to attend.  All questions answered & concerns addressed.    10/26/21  Curry is a 55 yo HIV + AAM evaluated by audio-video telemedicine.  He/She is located at home, and I, the provider, am located at [facility].  We are both located in [place], the [facility] in which I am licensed to practice.  The patient consents to telemedicine visit and is the only individual online.  The patient (or patient's representative) stated that they understood and accepted the privacy and security risks to their information at their location.  He reports 100% adherent to Triumeq, tolerates well with viral suppression.  Labs collected 10/20/21 VL 20, CD4 1627.  He tells me that he has a smart watch that checks his BP frequently, usually running in the 120s/80s.  He states that he is not  taking BP meds.  He received flu vax at work 2 weeks ago.  He tells me that he needs assistance with scheduling f/u with PCP as he calls the clinic, leaves a message, but does not get a return call. Will assist.  All questions answered & concerns addressed.  Face to face time spent with patient exceeds 15 minutes, over 50% of which was used for education and counseling regarding medical conditions, current medications including risk/benefit and side effects/adverse events, over the counter medications-uses/doses, home self-care and contact precautions, and red flags and indications for immediate medical attention.  The patient is receptive, expresses understanding and is agreeable to plan. All questions answered.    3/22/21  Curyr is a 56 yo BM presenting today for HIV f/u visit.  He reports 100% adherent to Triumeq, tolerates well with viral suppression.  Last labs 8/20 TIMA <20, Cd4 1583.  He has not received appt to f/u with PCP RANDY Abdalla NP, states that he missed 1 appt & tried to call to r/s numerous times without receiving a return call.  He appreciates assistance with same.  BP elevated this am, states that he took BP right before appt time.  He is fasting this am for labs.  Due for PPSV 23 today, amenable to same.  States that he is not ready to take COVID vaccine, feels like it is too soon & wants to wait for more data before taking it.  Due for routine anal pap, amenable to same.   & monogamous, no new sexual partners, declines need for oral & anal ct/gc swabs.  States that insurance did not cover repeat colonoscopy & could not afford quoted price of $1600.  Appreciates referral to [facility].  Last colonoscopy 2016 with polyps at [facility].  He is taking vitamin d2 every other week as prescribed.    Review of Systems   Constitutional: Negative.    HENT: Negative.     Respiratory: Negative.     Cardiovascular: Negative.    Gastrointestinal: Negative.    Genitourinary: Negative.     Integumentary:  Negative.   Neurological: Negative.    Hematological: Negative.    Psychiatric/Behavioral: Negative.         Objective:      Physical Exam  Vitals reviewed.   Constitutional:       General: He is not in acute distress.     Appearance: Normal appearance. He is not toxic-appearing.   HENT:      Mouth/Throat:      Mouth: Mucous membranes are moist.      Pharynx: Oropharynx is clear.   Eyes:      Conjunctiva/sclera: Conjunctivae normal.   Cardiovascular:      Rate and Rhythm: Normal rate and regular rhythm.   Pulmonary:      Effort: Pulmonary effort is normal. No respiratory distress.      Breath sounds: Normal breath sounds.   Abdominal:      General: Abdomen is flat. Bowel sounds are normal.      Palpations: Abdomen is soft.   Musculoskeletal:         General: Normal range of motion.      Cervical back: Normal range of motion.   Lymphadenopathy:      Cervical: No cervical adenopathy.   Skin:     General: Skin is warm and dry.   Neurological:      General: No focal deficit present.      Mental Status: He is alert and oriented to person, place, and time. Mental status is at baseline.   Psychiatric:         Mood and Affect: Mood normal.         Behavior: Behavior normal.      Assessment:       Problem List Items Addressed This Visit    None  Visit Diagnoses       HIV disease    -  Primary    Relevant Medications    TRIUMEQ 600- mg Tab    Other Relevant Orders    Basic Metabolic Panel    CBC Auto Differential    CD4 Lymphocytes    Comprehensive Metabolic Panel    HIV-1 RNA, Quantitative, PCR with Reflex to Genotype    Need for vaccination        Relevant Orders    Zoster Recombinant Vaccine    Vitamin D deficiency        Relevant Medications    ergocalciferol (VITAMIN D2) 50,000 unit Cap    Other Relevant Orders    Vitamin D    History of colon polyps        Relevant Orders    Ambulatory referral/consult to Gastroenterology    Family history of colon cancer in mother        Relevant Orders     Ambulatory referral/consult to Gastroenterology    Elevated serum creatinine                  Plan:       HIV disease  -     TRIUMEQ 600- mg Tab; Take 1 tablet by mouth once daily.  Dispense: 30 tablet; Refill: 0  -     Cancel: Basic Metabolic Panel; Future; Expected date: 09/13/2022  -     CBC Auto Differential; Future; Expected date: 11/30/2022  -     CD4 Lymphocytes; Future; Expected date: 11/30/2022  -     Comprehensive Metabolic Panel; Future; Expected date: 11/30/2022  -     HIV-1 RNA, Quantitative, PCR with Reflex to Genotype; Future; Expected date: 11/30/2022  Adherence and sexual health counseling done.  Use condoms for all sexual encounters.  Blood precautions.   Resume Triumeq 1 po daily as prescribed.  Labs 1 week prior to next visit.  RTC 3 months with Katelyn.    HIV Wellness:  Anal pap: 3/21 QNS, declines 8/30/22  Oral CT/GC: 5/19 Neg  Anal CT/GC: 5/19 Neg  Urine CT/GC: 7/22 Neg  RPR: 7/22  NR  Ophth: 9/22  DEXA: 2/16 -1.1  Colon Cancer Screen: 11/2016 Dr. Magana c-scope with polypectomy; mother with history of colon cancer     Need for vaccination  -     Zoster Recombinant Vaccine  Shingrix #1 today     Vitamin D deficiency  -     ergocalciferol (VITAMIN D2) 50,000 unit Cap; Take 1 capsule (50,000 Units total) by mouth every 7 days.  Dispense: 5 capsule; Refill: 4  -     Vitamin D; Future; Expected date: 11/30/2022  -     DXA Bone Density Spine And Hip; Future; Expected date: 09/13/2022  Vitamin D2 weekly    History of colon polyps  -     Ambulatory referral/consult to Gastroenterology; Future; Expected date: 09/06/2022  Refer to Dr. Magana for repeat colonoscopy     Family history of colon cancer in mother  -     Ambulatory referral/consult to Gastroenterology; Future; Expected date: 09/06/2022    Elevated serum creatinine  -     Basic Metabolic Panel; Future; Expected date: 08/30/2022  Low sodium (<2 grams per day), moderate protein intake.  Glucose and BP control optimization.  Avoid  protein supplements and NSAIDS (Advil, ibuprofen, Aleve, Mobic, etc.)  Keep hydrated, drink plenty of water.  Increase exercise to 30 minutes 3-5 times per week.  Maintain a healthy weight.   Smoking cessation encouraged.   Minimize all alcohol consumption.    Repeat BMP today.    Osteopenia, unspecified location  -     DXA Bone Density Spine And Hip; Future; Expected date: 09/13/2022      Repeat DEXA next available.

## 2022-09-01 NOTE — TELEPHONE ENCOUNTER
Called and spoke with patient. Informed him that Katelyn reviewed his results. Patient stated that he saw his results on the portal and does not have any questions at this time. Understanding voiced.

## 2022-09-19 ENCOUNTER — HOSPITAL ENCOUNTER (OUTPATIENT)
Dept: RADIOLOGY | Facility: HOSPITAL | Age: 57
Discharge: HOME | End: 2022-09-19
Attending: NURSE PRACTITIONER
Payer: COMMERCIAL

## 2022-09-19 DIAGNOSIS — E55.9 VITAMIN D DEFICIENCY: ICD-10-CM

## 2022-09-19 DIAGNOSIS — M85.80 OSTEOPENIA, UNSPECIFIED LOCATION: ICD-10-CM

## 2022-09-19 PROCEDURE — 77080 DXA BONE DENSITY AXIAL: CPT | Mod: TC

## 2022-12-20 ENCOUNTER — DOCUMENTATION ONLY (OUTPATIENT)
Dept: INTERNAL MEDICINE | Facility: CLINIC | Age: 57
End: 2022-12-20
Payer: COMMERCIAL

## 2023-02-17 ENCOUNTER — OFFICE VISIT (OUTPATIENT)
Dept: INFECTIOUS DISEASES | Facility: CLINIC | Age: 58
End: 2023-02-17
Payer: COMMERCIAL

## 2023-02-17 VITALS
WEIGHT: 229.69 LBS | RESPIRATION RATE: 16 BRPM | HEIGHT: 72 IN | SYSTOLIC BLOOD PRESSURE: 165 MMHG | DIASTOLIC BLOOD PRESSURE: 91 MMHG | BODY MASS INDEX: 31.11 KG/M2 | TEMPERATURE: 98 F | HEART RATE: 66 BPM

## 2023-02-17 DIAGNOSIS — E55.9 VITAMIN D DEFICIENCY: ICD-10-CM

## 2023-02-17 DIAGNOSIS — M54.50 CHRONIC MIDLINE LOW BACK PAIN WITHOUT SCIATICA: ICD-10-CM

## 2023-02-17 DIAGNOSIS — Z23 NEED FOR VACCINATION: ICD-10-CM

## 2023-02-17 DIAGNOSIS — B20 HIV DISEASE: Primary | ICD-10-CM

## 2023-02-17 DIAGNOSIS — M25.552 LEFT HIP PAIN: ICD-10-CM

## 2023-02-17 DIAGNOSIS — G89.29 CHRONIC MIDLINE LOW BACK PAIN WITHOUT SCIATICA: ICD-10-CM

## 2023-02-17 LAB
ALBUMIN SERPL-MCNC: 4 G/DL (ref 3.5–5)
ALBUMIN/GLOB SERPL: 1.3 RATIO (ref 1.1–2)
ALP SERPL-CCNC: 100 UNIT/L (ref 40–150)
ALT SERPL-CCNC: 16 UNIT/L (ref 0–55)
AST SERPL-CCNC: 21 UNIT/L (ref 5–34)
BASOPHILS # BLD AUTO: 0.03 X10(3)/MCL (ref 0–0.2)
BASOPHILS NFR BLD AUTO: 0.5 %
BILIRUBIN DIRECT+TOT PNL SERPL-MCNC: 0.5 MG/DL
BUN SERPL-MCNC: 13.2 MG/DL (ref 8.4–25.7)
CALCIUM SERPL-MCNC: 9.4 MG/DL (ref 8.4–10.2)
CHLORIDE SERPL-SCNC: 104 MMOL/L (ref 98–107)
CO2 SERPL-SCNC: 28 MMOL/L (ref 22–29)
CREAT SERPL-MCNC: 1.16 MG/DL (ref 0.73–1.18)
EOSINOPHIL # BLD AUTO: 0.22 X10(3)/MCL (ref 0–0.9)
EOSINOPHIL NFR BLD AUTO: 3.4 %
ERYTHROCYTE [DISTWIDTH] IN BLOOD BY AUTOMATED COUNT: 12.2 % (ref 11.5–17)
GFR SERPLBLD CREATININE-BSD FMLA CKD-EPI: >60 MLS/MIN/1.73/M2
GLOBULIN SER-MCNC: 3.1 GM/DL (ref 2.4–3.5)
GLUCOSE SERPL-MCNC: 83 MG/DL (ref 74–100)
HCT VFR BLD AUTO: 42.4 % (ref 42–52)
HGB BLD-MCNC: 14.3 GM/DL (ref 14–18)
IMM GRANULOCYTES # BLD AUTO: 0.01 X10(3)/MCL (ref 0–0.04)
IMM GRANULOCYTES NFR BLD AUTO: 0.2 %
LYMPHOCYTES # BLD AUTO: 2.57 X10(3)/MCL (ref 0.6–4.6)
LYMPHOCYTES NFR BLD AUTO: 39.3 %
MCH RBC QN AUTO: 31.7 PG
MCHC RBC AUTO-ENTMCNC: 33.7 MG/DL (ref 33–36)
MCV RBC AUTO: 94 FL (ref 80–94)
MONOCYTES # BLD AUTO: 0.54 X10(3)/MCL (ref 0.1–1.3)
MONOCYTES NFR BLD AUTO: 8.3 %
NEUTROPHILS # BLD AUTO: 3.17 X10(3)/MCL (ref 2.1–9.2)
NEUTROPHILS NFR BLD AUTO: 48.3 %
NRBC BLD AUTO-RTO: 0 %
PLATELET # BLD AUTO: 141 X10(3)/MCL (ref 130–400)
PMV BLD AUTO: 12 FL (ref 7.4–10.4)
POTASSIUM SERPL-SCNC: 4.1 MMOL/L (ref 3.5–5.1)
PROT SERPL-MCNC: 7.1 GM/DL (ref 6.4–8.3)
RBC # BLD AUTO: 4.51 X10(6)/MCL (ref 4.7–6.1)
SODIUM SERPL-SCNC: 140 MMOL/L (ref 136–145)
WBC # SPEC AUTO: 6.5 X10(3)/MCL (ref 4.5–11.5)

## 2023-02-17 PROCEDURE — 90471 IMMUNIZATION ADMIN: CPT | Mod: PBBFAC

## 2023-02-17 PROCEDURE — 90750 HZV VACC RECOMBINANT IM: CPT | Mod: PBBFAC

## 2023-02-17 PROCEDURE — 3008F PR BODY MASS INDEX (BMI) DOCUMENTED: ICD-10-PCS | Mod: CPTII,,, | Performed by: NURSE PRACTITIONER

## 2023-02-17 PROCEDURE — 3077F SYST BP >= 140 MM HG: CPT | Mod: CPTII,,, | Performed by: NURSE PRACTITIONER

## 2023-02-17 PROCEDURE — 85025 COMPLETE CBC W/AUTO DIFF WBC: CPT | Performed by: NURSE PRACTITIONER

## 2023-02-17 PROCEDURE — 80053 COMPREHEN METABOLIC PANEL: CPT | Performed by: NURSE PRACTITIONER

## 2023-02-17 PROCEDURE — 1159F PR MEDICATION LIST DOCUMENTED IN MEDICAL RECORD: ICD-10-PCS | Mod: CPTII,,, | Performed by: NURSE PRACTITIONER

## 2023-02-17 PROCEDURE — 3080F PR MOST RECENT DIASTOLIC BLOOD PRESSURE >= 90 MM HG: ICD-10-PCS | Mod: CPTII,,, | Performed by: NURSE PRACTITIONER

## 2023-02-17 PROCEDURE — 3077F PR MOST RECENT SYSTOLIC BLOOD PRESSURE >= 140 MM HG: ICD-10-PCS | Mod: CPTII,,, | Performed by: NURSE PRACTITIONER

## 2023-02-17 PROCEDURE — 3080F DIAST BP >= 90 MM HG: CPT | Mod: CPTII,,, | Performed by: NURSE PRACTITIONER

## 2023-02-17 PROCEDURE — 99214 OFFICE O/P EST MOD 30 MIN: CPT | Mod: S$PBB,,, | Performed by: NURSE PRACTITIONER

## 2023-02-17 PROCEDURE — 1160F RVW MEDS BY RX/DR IN RCRD: CPT | Mod: CPTII,,, | Performed by: NURSE PRACTITIONER

## 2023-02-17 PROCEDURE — 36415 COLL VENOUS BLD VENIPUNCTURE: CPT | Performed by: NURSE PRACTITIONER

## 2023-02-17 PROCEDURE — 3008F BODY MASS INDEX DOCD: CPT | Mod: CPTII,,, | Performed by: NURSE PRACTITIONER

## 2023-02-17 PROCEDURE — 1159F MED LIST DOCD IN RCRD: CPT | Mod: CPTII,,, | Performed by: NURSE PRACTITIONER

## 2023-02-17 PROCEDURE — 99214 OFFICE O/P EST MOD 30 MIN: CPT | Mod: PBBFAC,25 | Performed by: NURSE PRACTITIONER

## 2023-02-17 PROCEDURE — 87536 HIV-1 QUANT&REVRSE TRNSCRPJ: CPT | Performed by: NURSE PRACTITIONER

## 2023-02-17 PROCEDURE — 99214 PR OFFICE/OUTPT VISIT, EST, LEVL IV, 30-39 MIN: ICD-10-PCS | Mod: S$PBB,,, | Performed by: NURSE PRACTITIONER

## 2023-02-17 PROCEDURE — 1160F PR REVIEW ALL MEDS BY PRESCRIBER/CLIN PHARMACIST DOCUMENTED: ICD-10-PCS | Mod: CPTII,,, | Performed by: NURSE PRACTITIONER

## 2023-02-17 RX ORDER — ABACAVIR SULFATE, DOLUTEGRAVIR SODIUM, LAMIVUDINE 600; 50; 300 MG/1; MG/1; MG/1
1 TABLET, FILM COATED ORAL DAILY
Qty: 30 TABLET | Refills: 4 | Status: SHIPPED | OUTPATIENT
Start: 2023-02-17 | End: 2023-07-10

## 2023-02-17 RX ORDER — ERGOCALCIFEROL 1.25 MG/1
50000 CAPSULE ORAL
Qty: 5 CAPSULE | Refills: 4 | Status: SHIPPED | OUTPATIENT
Start: 2023-02-17 | End: 2023-07-10

## 2023-02-17 NOTE — PROGRESS NOTES
Subjective:       Patient ID: Curry Bello is a 57 y.o. male.    Chief Complaint: Followup HIV    2/17/23  Curry is a 56 yo AAM here today for HIV f/u visit.  He takes Triumeq daily and denies any interruptions. Labs 7/22 VL UD, CD4 1623.  He has increased water intake & creatinine was improved with 8/22 labs.  Will repeat today as well.  He is taking vitamin d weekly as prescribed last visit, will check level today.  Tolerated 1st dose of Shingrix well, appreciates 2nd dose today. Dexa completed 9/22 Tscore -1.4 right femur. He tells me that he has been having lower back pain and left hip pain for at least a year now.  Some days are worse than others. Denies numbness or tingling, no change in bowel/bladder patterns.  Will return next week for Xrays as ordered. He states that he received call to schedule colonoscopy but did not return call.  He agrees to call Dr. Magana's office for same. All questions answered & concerns addressed.    8/30/22  Curry is a 57 yo AAM presenting today for HIV f/u visit.  He has been taking Triumeq daily as prescribed until running out 1 week ago due to missed appointments.  He is eager to resume treatment.  Last labs 7/14/22 VL UD, CD4 1623, creatinine 1.33.  He denies use of protein supplements but does take 2 naproxen daily for back pain.  He will try switching to acetaminophen products.  He has cut back on soda to 1 per day & drinks 6-8 bottles of water daily.  Will repeat BMP today.  He is , no new sexual partners.  Has never received any type of anal penetration, declines repeat anal pap.  He is past due for repeat colonoscopy.  Last c-scope with polypectomy 11/2016 by Dr. Magana, mother with history of colon cancer.  Referral order placed.  Pt agrees to attend.  Vitamin D level 14.  He tells me that he is taking OTC supplement when he remembers, about once a week.  Amenable to prescription for same with weekly dosing.  Last DEXA 2/16, order placed for repeat.   Appreciates Shingrix vaccination today.  Scheduled for eye exam next month, plans to attend.  All questions answered & concerns addressed.     10/26/21  Curry is a 55 yo HIV + AAM evaluated by audio-video telemedicine.  He/She is located at home, and I, the provider, am located at [facility].  We are both located in [place], the [facility] in which I am licensed to practice.  The patient consents to telemedicine visit and is the only individual online.  The patient (or patient's representative) stated that they understood and accepted the privacy and security risks to their information at their location.  He reports 100% adherent to Triumeq, tolerates well with viral suppression.  Labs collected 10/20/21 VL 20, CD4 1627.  He tells me that he has a smart watch that checks his BP frequently, usually running in the 120s/80s.  He states that he is not taking BP meds.  He received flu vax at work 2 weeks ago.  He tells me that he needs assistance with scheduling f/u with PCP as he calls the clinic, leaves a message, but does not get a return call. Will assist.  All questions answered & concerns addressed.  Face to face time spent with patient exceeds 15 minutes, over 50% of which was used for education and counseling regarding medical conditions, current medications including risk/benefit and side effects/adverse events, over the counter medications-uses/doses, home self-care and contact precautions, and red flags and indications for immediate medical attention.  The patient is receptive, expresses understanding and is agreeable to plan. All questions answered.    Review of Systems   Constitutional: Negative.    HENT: Negative.     Respiratory: Negative.     Cardiovascular: Negative.    Gastrointestinal: Negative.    Genitourinary: Negative.    Integumentary:  Negative.   Neurological: Negative.    Hematological: Negative.    Psychiatric/Behavioral: Negative.         Objective:      Physical Exam  Vitals reviewed.    Constitutional:       General: He is not in acute distress.     Appearance: Normal appearance. He is not toxic-appearing.   HENT:      Mouth/Throat:      Mouth: Mucous membranes are moist.      Pharynx: Oropharynx is clear.   Eyes:      Conjunctiva/sclera: Conjunctivae normal.   Cardiovascular:      Rate and Rhythm: Normal rate and regular rhythm.   Pulmonary:      Effort: Pulmonary effort is normal. No respiratory distress.      Breath sounds: Normal breath sounds.   Abdominal:      General: Abdomen is flat. Bowel sounds are normal.      Palpations: Abdomen is soft.   Musculoskeletal:         General: Normal range of motion.      Cervical back: Normal range of motion.   Lymphadenopathy:      Cervical: No cervical adenopathy.   Skin:     General: Skin is warm and dry.   Neurological:      General: No focal deficit present.      Mental Status: He is alert and oriented to person, place, and time. Mental status is at baseline.   Psychiatric:         Mood and Affect: Mood normal.         Behavior: Behavior normal.       Assessment:       Problem List Items Addressed This Visit    None  Visit Diagnoses       HIV disease    -  Primary              Plan:           HIV disease  -     TRIUMEQ 600- mg Tab; Take 1 tablet by mouth once daily.  Dispense: 30 tablet; Refill: 4  -     CBC Auto Differential; Future; Expected date: 02/17/2023  -     CD4 Lymphocytes; Future; Expected date: 02/17/2023  -     Comprehensive Metabolic Panel  -     HIV-1 RNA, Quantitative, PCR with Reflex to Genotype; Future; Expected date: 02/17/2023  Adherence and sexual health counseling done.  Use condoms for all sexual encounters.  Blood precautions.   Continue Triumeq 1 po daily as prescribed.  Labs today.  RTC 4 months with Katelyn.  Pt needs f/u with PCP, please assist with scheduling same.      HIV Wellness:  Anal pap: 3/21 QNS, declines 8/30/22  Oral CT/GC: 5/19 Neg  Anal CT/GC: 5/19 Neg  Urine CT/GC: 7/22 Neg  RPR: 7/22  NR  Ophth:  9/22  DEXA: 2/16 -1.1  Colon Cancer Screen: 11/2016 Dr. Magana c-scope with polypectomy; mother with history of colon cancer (pt agrees to call for an appt to repeat scope)    Vitamin D deficiency  -     ergocalciferol (VITAMIN D2) 50,000 unit Cap; Take 1 capsule (50,000 Units total) by mouth every 7 days.  Dispense: 5 capsule; Refill: 4  -     Vitamin D; Future; Expected date: 05/17/2023  Continue Vitamin D2 weekly.  Check level today.     Need for vaccination  -     Zoster Recombinant Vaccine  Shingrix #2 today.     Left hip pain  -     X-Ray Hip 2 or 3 views Left (with Pelvis when performed); Future; Expected date: 02/17/2023  XR left hip, pt will return next week for same.     Chronic midline low back pain without sciatica  -     X-Ray Lumbar Spine AP And Lateral; Future; Expected date: 02/17/2023  Xray L-spine, pt will return next week for same.

## 2023-02-20 LAB — HIV1 RNA # PLAS NAA DL=20: NORMAL COPIES/ML

## 2023-04-25 ENCOUNTER — PATIENT MESSAGE (OUTPATIENT)
Dept: RESEARCH | Facility: HOSPITAL | Age: 58
End: 2023-04-25
Payer: COMMERCIAL

## 2023-05-02 ENCOUNTER — PATIENT MESSAGE (OUTPATIENT)
Dept: RESEARCH | Facility: HOSPITAL | Age: 58
End: 2023-05-02
Payer: COMMERCIAL

## 2023-05-09 DIAGNOSIS — F32.9 MAJOR DEPRESSIVE DISORDER, SINGLE EPISODE, UNSPECIFIED: ICD-10-CM

## 2023-05-09 RX ORDER — SERTRALINE HYDROCHLORIDE 100 MG/1
TABLET, FILM COATED ORAL
Qty: 135 TABLET | Refills: 0 | Status: SHIPPED | OUTPATIENT
Start: 2023-05-09 | End: 2024-03-05 | Stop reason: SDUPTHER

## 2023-07-10 DIAGNOSIS — E55.9 VITAMIN D DEFICIENCY: ICD-10-CM

## 2023-07-10 DIAGNOSIS — B20 HIV DISEASE: ICD-10-CM

## 2023-07-10 RX ORDER — ABACAVIR SULFATE, DOLUTEGRAVIR SODIUM, LAMIVUDINE 600; 50; 300 MG/1; MG/1; MG/1
TABLET, FILM COATED ORAL
Qty: 30 TABLET | Refills: 0 | Status: SHIPPED | OUTPATIENT
Start: 2023-07-10 | End: 2023-09-06

## 2023-07-10 RX ORDER — ERGOCALCIFEROL 1.25 MG/1
CAPSULE ORAL
Qty: 5 CAPSULE | Refills: 0 | Status: SHIPPED | OUTPATIENT
Start: 2023-07-10 | End: 2023-09-06

## 2023-09-06 ENCOUNTER — TELEPHONE (OUTPATIENT)
Dept: INFECTIOUS DISEASES | Facility: CLINIC | Age: 58
End: 2023-09-06
Payer: COMMERCIAL

## 2023-09-06 DIAGNOSIS — B20 HIV DISEASE: ICD-10-CM

## 2023-09-06 DIAGNOSIS — E55.9 VITAMIN D DEFICIENCY: ICD-10-CM

## 2023-09-06 DIAGNOSIS — R10.9 UNSPECIFIED ABDOMINAL PAIN: ICD-10-CM

## 2023-09-06 RX ORDER — ABACAVIR SULFATE, DOLUTEGRAVIR SODIUM, LAMIVUDINE 600; 50; 300 MG/1; MG/1; MG/1
TABLET, FILM COATED ORAL
Qty: 30 TABLET | Refills: 0 | Status: SHIPPED | OUTPATIENT
Start: 2023-09-06 | End: 2024-03-05 | Stop reason: SDUPTHER

## 2023-09-06 RX ORDER — ERGOCALCIFEROL 1.25 MG/1
50000 CAPSULE ORAL
Qty: 5 CAPSULE | Refills: 0 | Status: SHIPPED | OUTPATIENT
Start: 2023-09-06 | End: 2024-03-05 | Stop reason: SDUPTHER

## 2023-09-06 RX ORDER — OMEPRAZOLE 20 MG/1
CAPSULE, DELAYED RELEASE ORAL
Qty: 90 CAPSULE | Refills: 0 | Status: SHIPPED | OUTPATIENT
Start: 2023-09-06

## 2023-09-06 NOTE — TELEPHONE ENCOUNTER
----- Message from Pilar Bob sent at 9/6/2023  2:19 PM CDT -----  Regarding: Unable to reach, LVM  GIANNA PT       LVM for the pt to call and rs the missed appt

## 2023-12-08 ENCOUNTER — TELEPHONE (OUTPATIENT)
Dept: INFECTIOUS DISEASES | Facility: CLINIC | Age: 58
End: 2023-12-08
Payer: COMMERCIAL

## 2023-12-08 DIAGNOSIS — B20 HIV DISEASE: ICD-10-CM

## 2023-12-08 RX ORDER — ABACAVIR SULFATE, DOLUTEGRAVIR SODIUM, LAMIVUDINE 600; 50; 300 MG/1; MG/1; MG/1
1 TABLET, FILM COATED ORAL DAILY
Qty: 30 TABLET | Refills: 0 | OUTPATIENT
Start: 2023-12-08

## 2023-12-08 NOTE — TELEPHONE ENCOUNTER
Last visit with Katelyn Abdalla, APRN: 2/17/2023  Last visit in [facility] INFECTIOUS DISEASE: Visit date not found    Patient's next visit in [facility] INFECTIOUS DISEASE: Visit date not found     LL 02/17/2023    Please advise on medication refill

## 2023-12-08 NOTE — TELEPHONE ENCOUNTER
Patient needs appt scheduling and must come in for appt for refills. Please contact to schedule. Thank you.

## 2023-12-08 NOTE — TELEPHONE ENCOUNTER
----- Message from Barbra Amaya sent at 12/8/2023 12:19 PM CST -----  Patient of NIMA Abdalla     Patient requesting refills on [facility] as listed on chart     [phone]    Thanks

## 2024-01-09 ENCOUNTER — TELEPHONE (OUTPATIENT)
Dept: INFECTIOUS DISEASES | Facility: CLINIC | Age: 59
End: 2024-01-09
Payer: COMMERCIAL

## 2024-01-09 NOTE — TELEPHONE ENCOUNTER
----- Message from Amanda Underwood MA sent at 1/9/2024  8:27 AM CST -----    ----- Message -----  From: Barbra Amaya  Sent: 1/9/2024   8:17 AM CST  To: [facility] Clinical Support Staff    Patient  of NIMA Abdalla    Patient called to schedule an appointment. According to documentation that I see, patient was last     seen in 2021. Can you please check and tell me if intake is needed again?      Please Advise        [phone]

## 2024-01-09 NOTE — TELEPHONE ENCOUNTER
Last clinic visit noted was 02/17/2023. Patient needs appt scheduling. No intake needed. Thank you.

## 2024-03-05 ENCOUNTER — OFFICE VISIT (OUTPATIENT)
Dept: INFECTIOUS DISEASES | Facility: CLINIC | Age: 59
End: 2024-03-05
Payer: COMMERCIAL

## 2024-03-05 VITALS
HEART RATE: 81 BPM | TEMPERATURE: 99 F | DIASTOLIC BLOOD PRESSURE: 78 MMHG | WEIGHT: 242 LBS | SYSTOLIC BLOOD PRESSURE: 155 MMHG | RESPIRATION RATE: 16 BRPM | BODY MASS INDEX: 32.78 KG/M2 | HEIGHT: 72 IN

## 2024-03-05 DIAGNOSIS — R10.9 UNSPECIFIED ABDOMINAL PAIN: ICD-10-CM

## 2024-03-05 DIAGNOSIS — I49.1 PAC (PREMATURE ATRIAL CONTRACTION): ICD-10-CM

## 2024-03-05 DIAGNOSIS — R16.0 HEPATOMEGALY: ICD-10-CM

## 2024-03-05 DIAGNOSIS — F32.9 MAJOR DEPRESSIVE DISORDER, SINGLE EPISODE, UNSPECIFIED: ICD-10-CM

## 2024-03-05 DIAGNOSIS — B20 HIV DISEASE: Primary | ICD-10-CM

## 2024-03-05 DIAGNOSIS — I10 PRIMARY HYPERTENSION: ICD-10-CM

## 2024-03-05 DIAGNOSIS — E55.9 VITAMIN D DEFICIENCY: ICD-10-CM

## 2024-03-05 DIAGNOSIS — R14.0 ABDOMINAL DISTENSION: ICD-10-CM

## 2024-03-05 DIAGNOSIS — I49.9 IRREGULAR HEART RATE: ICD-10-CM

## 2024-03-05 DIAGNOSIS — E78.5 HYPERLIPIDEMIA, UNSPECIFIED HYPERLIPIDEMIA TYPE: ICD-10-CM

## 2024-03-05 DIAGNOSIS — R00.1 SINUS BRADYCARDIA BY ELECTROCARDIOGRAM: ICD-10-CM

## 2024-03-05 DIAGNOSIS — M25.551 RIGHT HIP PAIN: ICD-10-CM

## 2024-03-05 LAB
ALBUMIN SERPL-MCNC: 3.8 G/DL (ref 3.4–4.8)
ALBUMIN/GLOB SERPL: 1 RATIO (ref 1.1–2)
ALP SERPL-CCNC: 108 UNIT/L (ref 40–150)
ALT SERPL-CCNC: 21 UNIT/L (ref 0–55)
APPEARANCE UR: CLEAR
AST SERPL-CCNC: 28 UNIT/L (ref 5–34)
BACTERIA #/AREA URNS AUTO: ABNORMAL /HPF
BASOPHILS # BLD AUTO: 0.04 X10(3)/MCL
BASOPHILS NFR BLD AUTO: 0.5 %
BILIRUB SERPL-MCNC: 0.3 MG/DL
BILIRUB UR QL STRIP.AUTO: NEGATIVE
BUN SERPL-MCNC: 13.2 MG/DL (ref 8.4–25.7)
C TRACH DNA SPEC QL NAA+PROBE: NOT DETECTED
CALCIUM SERPL-MCNC: 8.8 MG/DL (ref 8.8–10)
CHLORIDE SERPL-SCNC: 108 MMOL/L (ref 98–107)
CHOLEST SERPL-MCNC: 196 MG/DL
CHOLEST/HDLC SERPL: 5 {RATIO} (ref 0–5)
CO2 SERPL-SCNC: 24 MMOL/L (ref 23–31)
COLOR UR AUTO: YELLOW
CREAT SERPL-MCNC: 1.08 MG/DL (ref 0.73–1.18)
DEPRECATED CALCIDIOL+CALCIFEROL SERPL-MC: 13.5 NG/ML (ref 30–80)
EOSINOPHIL # BLD AUTO: 0.25 X10(3)/MCL (ref 0–0.9)
EOSINOPHIL NFR BLD AUTO: 3.4 %
ERYTHROCYTE [DISTWIDTH] IN BLOOD BY AUTOMATED COUNT: 12.4 % (ref 11.5–17)
EST. AVERAGE GLUCOSE BLD GHB EST-MCNC: 111.2 MG/DL
GFR SERPLBLD CREATININE-BSD FMLA CKD-EPI: >60 MLS/MIN/1.73/M2
GLOBULIN SER-MCNC: 3.7 GM/DL (ref 2.4–3.5)
GLUCOSE SERPL-MCNC: 106 MG/DL (ref 82–115)
GLUCOSE UR QL STRIP.AUTO: NORMAL
HAV AB SER QL IA: REACTIVE
HBA1C MFR BLD: 5.5 %
HCT VFR BLD AUTO: 45.1 % (ref 42–52)
HCV AB SERPL QL IA: NONREACTIVE
HDLC SERPL-MCNC: 41 MG/DL (ref 35–60)
HGB BLD-MCNC: 15.2 G/DL (ref 14–18)
HYALINE CASTS #/AREA URNS LPF: ABNORMAL /LPF
IMM GRANULOCYTES # BLD AUTO: 0.01 X10(3)/MCL (ref 0–0.04)
IMM GRANULOCYTES NFR BLD AUTO: 0.1 %
KETONES UR QL STRIP.AUTO: NEGATIVE
LDLC SERPL CALC-MCNC: 132 MG/DL (ref 50–140)
LEUKOCYTE ESTERASE UR QL STRIP.AUTO: 75
LYMPHOCYTES # BLD AUTO: 2.99 X10(3)/MCL (ref 0.6–4.6)
LYMPHOCYTES NFR BLD AUTO: 40.9 %
MCH RBC QN AUTO: 30.2 PG (ref 27–31)
MCHC RBC AUTO-ENTMCNC: 33.7 G/DL (ref 33–36)
MCV RBC AUTO: 89.7 FL (ref 80–94)
MONOCYTES # BLD AUTO: 0.63 X10(3)/MCL (ref 0.1–1.3)
MONOCYTES NFR BLD AUTO: 8.6 %
MUCOUS THREADS URNS QL MICRO: ABNORMAL /LPF
N GONORRHOEA DNA SPEC QL NAA+PROBE: NOT DETECTED
NEUTROPHILS # BLD AUTO: 3.39 X10(3)/MCL (ref 2.1–9.2)
NEUTROPHILS NFR BLD AUTO: 46.5 %
NITRITE UR QL STRIP.AUTO: NEGATIVE
NRBC BLD AUTO-RTO: 0 %
OHS QRS DURATION: 76 MS
OHS QTC CALCULATION: 399 MS
PH UR STRIP.AUTO: 5.5 [PH]
PLATELET # BLD AUTO: 146 X10(3)/MCL (ref 130–400)
PMV BLD AUTO: 11.7 FL (ref 7.4–10.4)
POTASSIUM SERPL-SCNC: 3.8 MMOL/L (ref 3.5–5.1)
PROT SERPL-MCNC: 7.5 GM/DL (ref 5.8–7.6)
PROT UR QL STRIP.AUTO: ABNORMAL
RBC # BLD AUTO: 5.03 X10(6)/MCL (ref 4.7–6.1)
RBC #/AREA URNS AUTO: ABNORMAL /HPF
RBC UR QL AUTO: NEGATIVE
SODIUM SERPL-SCNC: 139 MMOL/L (ref 136–145)
SOURCE (OHS): NORMAL
SP GR UR STRIP.AUTO: 1.03 (ref 1–1.03)
SQUAMOUS #/AREA URNS LPF: ABNORMAL /HPF
T PALLIDUM AB SER QL: NONREACTIVE
TRIGL SERPL-MCNC: 117 MG/DL (ref 34–140)
TSH SERPL-ACNC: 1.99 UIU/ML (ref 0.35–4.94)
UROBILINOGEN UR STRIP-ACNC: ABNORMAL
VLDLC SERPL CALC-MCNC: 23 MG/DL
WBC # SPEC AUTO: 7.31 X10(3)/MCL (ref 4.5–11.5)
WBC #/AREA URNS AUTO: ABNORMAL /HPF

## 2024-03-05 PROCEDURE — 87086 URINE CULTURE/COLONY COUNT: CPT | Performed by: NURSE PRACTITIONER

## 2024-03-05 PROCEDURE — 99215 OFFICE O/P EST HI 40 MIN: CPT | Mod: S$PBB,,, | Performed by: NURSE PRACTITIONER

## 2024-03-05 PROCEDURE — 99215 OFFICE O/P EST HI 40 MIN: CPT | Mod: PBBFAC,25 | Performed by: NURSE PRACTITIONER

## 2024-03-05 PROCEDURE — 84443 ASSAY THYROID STIM HORMONE: CPT | Performed by: NURSE PRACTITIONER

## 2024-03-05 PROCEDURE — 81001 URINALYSIS AUTO W/SCOPE: CPT | Performed by: NURSE PRACTITIONER

## 2024-03-05 PROCEDURE — 87491 CHLMYD TRACH DNA AMP PROBE: CPT | Performed by: NURSE PRACTITIONER

## 2024-03-05 PROCEDURE — 80061 LIPID PANEL: CPT | Performed by: NURSE PRACTITIONER

## 2024-03-05 PROCEDURE — 83036 HEMOGLOBIN GLYCOSYLATED A1C: CPT | Performed by: NURSE PRACTITIONER

## 2024-03-05 PROCEDURE — G2211 COMPLEX E/M VISIT ADD ON: HCPCS | Mod: S$PBB,,, | Performed by: NURSE PRACTITIONER

## 2024-03-05 PROCEDURE — 1159F MED LIST DOCD IN RCRD: CPT | Mod: CPTII,,, | Performed by: NURSE PRACTITIONER

## 2024-03-05 PROCEDURE — 1160F RVW MEDS BY RX/DR IN RCRD: CPT | Mod: CPTII,,, | Performed by: NURSE PRACTITIONER

## 2024-03-05 PROCEDURE — 93005 ELECTROCARDIOGRAM TRACING: CPT

## 2024-03-05 PROCEDURE — 80053 COMPREHEN METABOLIC PANEL: CPT | Performed by: NURSE PRACTITIONER

## 2024-03-05 PROCEDURE — 3078F DIAST BP <80 MM HG: CPT | Mod: CPTII,,, | Performed by: NURSE PRACTITIONER

## 2024-03-05 PROCEDURE — 86803 HEPATITIS C AB TEST: CPT | Performed by: NURSE PRACTITIONER

## 2024-03-05 PROCEDURE — 85025 COMPLETE CBC W/AUTO DIFF WBC: CPT | Performed by: NURSE PRACTITIONER

## 2024-03-05 PROCEDURE — 86708 HEPATITIS A ANTIBODY: CPT | Performed by: NURSE PRACTITIONER

## 2024-03-05 PROCEDURE — 86480 TB TEST CELL IMMUN MEASURE: CPT | Performed by: NURSE PRACTITIONER

## 2024-03-05 PROCEDURE — 82306 VITAMIN D 25 HYDROXY: CPT | Performed by: NURSE PRACTITIONER

## 2024-03-05 PROCEDURE — 86360 T CELL ABSOLUTE COUNT/RATIO: CPT | Performed by: NURSE PRACTITIONER

## 2024-03-05 PROCEDURE — 86780 TREPONEMA PALLIDUM: CPT | Performed by: NURSE PRACTITIONER

## 2024-03-05 PROCEDURE — 3077F SYST BP >= 140 MM HG: CPT | Mod: CPTII,,, | Performed by: NURSE PRACTITIONER

## 2024-03-05 PROCEDURE — 36415 COLL VENOUS BLD VENIPUNCTURE: CPT | Performed by: NURSE PRACTITIONER

## 2024-03-05 PROCEDURE — 3008F BODY MASS INDEX DOCD: CPT | Mod: CPTII,,, | Performed by: NURSE PRACTITIONER

## 2024-03-05 PROCEDURE — 87536 HIV-1 QUANT&REVRSE TRNSCRPJ: CPT | Performed by: NURSE PRACTITIONER

## 2024-03-05 PROCEDURE — 87077 CULTURE AEROBIC IDENTIFY: CPT | Performed by: NURSE PRACTITIONER

## 2024-03-05 RX ORDER — ATORVASTATIN CALCIUM 10 MG/1
10 TABLET, FILM COATED ORAL NIGHTLY
Qty: 90 TABLET | Refills: 3 | Status: SHIPPED | OUTPATIENT
Start: 2024-03-05 | End: 2025-03-05

## 2024-03-05 RX ORDER — ABACAVIR SULFATE, DOLUTEGRAVIR SODIUM, LAMIVUDINE 600; 50; 300 MG/1; MG/1; MG/1
1 TABLET, FILM COATED ORAL DAILY
Qty: 90 TABLET | Refills: 1 | Status: SHIPPED | OUTPATIENT
Start: 2024-03-05

## 2024-03-05 RX ORDER — ERGOCALCIFEROL 1.25 MG/1
50000 CAPSULE ORAL
Qty: 12 CAPSULE | Refills: 3 | Status: SHIPPED | OUTPATIENT
Start: 2024-03-05

## 2024-03-05 RX ORDER — AMLODIPINE BESYLATE 10 MG/1
10 TABLET ORAL DAILY
Qty: 90 TABLET | Refills: 3 | Status: SHIPPED | OUTPATIENT
Start: 2024-03-05 | End: 2025-03-05

## 2024-03-05 RX ORDER — SERTRALINE HYDROCHLORIDE 100 MG/1
150 TABLET, FILM COATED ORAL DAILY
Qty: 135 TABLET | Refills: 3 | Status: SHIPPED | OUTPATIENT
Start: 2024-03-05

## 2024-03-05 NOTE — PROGRESS NOTES
Patient ID: Curry Bello 58 y.o.     Chief Complaint:   Chief Complaint   Patient presents with    Followup HIV     Denies problems        HPI:    3/5/24  Curry is a 59 yo AAM presenting today for HIV return visit, last seen 1 year ago. He tells me that his wife had a stroke January 2023 and he has been busy caring for her while still working as well.  He has been off Triumeq since end of October due to lack of f/u. Last labs 2/23 VL UD, 7/22 Cd4 1624. He tells me that he is still taking atorvastatin 10 mg daily as previously prescribed. He is fasting this am for labs. Heart rate is irregular. Denies any known history of same. Denies chest pain, palpitations, shortness of breath, lightheadedness. BP is elevated. States that he was previously on Diovan several years ago, but it made him feel bad & he stopped taking it. Will try amlodipine 10 mg daily, amenable to same. He is taking otc vitamin d3, will check level today for same. Prefers to resume weekly dose of ergocalciferol, prescription sent. He tells me that he has noted abdominal distension and right sided pain. States that he is having regular bowel movements. Denies constipation, diarrhea, bloody, dark, tarry, klaudia colored stools. No jaundice or icterus. Abdomen is firm & distended. Family history (mother) of colon cancer, has not followed up with GI for repeat colonoscopy.  Last c-scope 11/2016 with polypectomy. CT abd & pelvis ordered for imaging. He continues with right hip pain for several years now. Did not have xray performed as ordered last visit. Will order again & patient agrees to have done this week. He is also noting sharp pain to right arm shooting to tip of middle finger with stretching/reaching motion, originating over skin graft area. Will try topical OTC pain relief cream pending further workup given complexity of presentation today. Voiced understanding. He was previously followed by Gladis Abdalla NP in [facility] for PCP, but has not been  seen in several years. Will refer back to her as well.  All questions answered & concerns addressed.    2/17/23  Curry is a 58 yo AAM here today for HIV f/u visit.  He takes Triumeq daily and denies any interruptions. Labs 7/22 VL UD, CD4 1623.  He has increased water intake & creatinine was improved with 8/22 labs.  Will repeat today as well.  He is taking vitamin d weekly as prescribed last visit, will check level today.  Tolerated 1st dose of Shingrix well, appreciates 2nd dose today. Dexa completed 9/22 Tscore -1.4 right femur. He tells me that he has been having lower back pain and left hip pain for at least a year now.  Some days are worse than others. Denies numbness or tingling, no change in bowel/bladder patterns.  Will return next week for Xrays as ordered. He states that he received call to schedule colonoscopy but did not return call.  He agrees to call Dr. Magana's office for same. All questions answered & concerns addressed.     8/30/22  Curry is a 55 yo AAM presenting today for HIV f/u visit.  He has been taking Triumeq daily as prescribed until running out 1 week ago due to missed appointments.  He is eager to resume treatment.  Last labs 7/14/22 VL UD, CD4 1623, creatinine 1.33.  He denies use of protein supplements but does take 2 naproxen daily for back pain.  He will try switching to acetaminophen products.  He has cut back on soda to 1 per day & drinks 6-8 bottles of water daily.  Will repeat BMP today.  He is , no new sexual partners.  Has never received any type of anal penetration, declines repeat anal pap.  He is past due for repeat colonoscopy.  Last c-scope with polypectomy 11/2016 by Dr. Magana, mother with history of colon cancer.  Referral order placed.  Pt agrees to attend.  Vitamin D level 14.  He tells me that he is taking OTC supplement when he remembers, about once a week.  Amenable to prescription for same with weekly dosing.  Last DEXA 2/16, order placed for  repeat.  Appreciates Shingrix vaccination today.  Scheduled for eye exam next month, plans to attend.  All questions answered & concerns addressed.           Past Medical History:   Diagnosis Date    Depression     GERD (gastroesophageal reflux disease)     HIV infection     Hyperlipidemia     Personal history of colonic polyps 11/28/2016    [facility]- Dr. Oliverio Magana        Past Surgical History:   Procedure Laterality Date    COLONOSCOPY W/ POLYPECTOMY  11/28/2016    [facility]- Dr. Oliverio Magana        Social History     Socioeconomic History    Marital status:    Tobacco Use    Smoking status: Never    Smokeless tobacco: Never   Substance and Sexual Activity    Alcohol use: Never    Drug use: Never    Sexual activity: Not Currently     Birth control/protection: Condom        History reviewed. No pertinent family history.     Review of patient's allergies indicates:   Allergen Reactions    Citrus and derivatives         Immunization History   Administered Date(s) Administered    COVID-19, vector-nr, rS-Ad26, PF (Steve) 04/09/2021    Hepatitis A / Hepatitis B 09/03/2015, 10/01/2015, 03/14/2016    Influenza - Quadrivalent 11/09/2016    Meningococcal Conjugate (MCV4P) 07/02/2018, 05/31/2019    Pneumococcal Conjugate - 13 Valent 09/03/2015    Pneumococcal Polysaccharide - 23 Valent 11/12/2015, 03/22/2021    Tdap 10/01/2015    Zoster Recombinant 08/30/2022, 02/17/2023        Review of Systems   Constitutional: Negative.  Negative for chills, diaphoresis, fever, malaise/fatigue and weight loss.   HENT: Negative.     Eyes: Negative.    Respiratory:  Negative for cough, hemoptysis, sputum production, shortness of breath and wheezing.    Cardiovascular:  Negative for chest pain, palpitations, orthopnea, claudication, leg swelling and PND.   Gastrointestinal:  Positive for abdominal pain. Negative for blood in stool, constipation, diarrhea, heartburn, melena,  nausea and vomiting.   Genitourinary: Negative.    Musculoskeletal:  Positive for joint pain.   Skin: Negative.  Negative for itching and rash.   Neurological:  Negative for dizziness, tingling, tremors, sensory change, speech change, focal weakness, seizures, loss of consciousness, weakness and headaches.   Endo/Heme/Allergies: Negative.    Psychiatric/Behavioral: Negative.            Objective:      BP (!) 165/77 (BP Location: Right arm, Patient Position: Sitting, BP Method: Large (Automatic))   Pulse 81   Temp 99 °F (37.2 °C) (Oral)   Resp 16   Ht 6' (1.829 m)   Wt 109.8 kg (242 lb)   BMI 32.82 kg/m²      Physical Exam  Vitals reviewed.   Constitutional:       General: He is not in acute distress.     Appearance: Normal appearance. He is obese. He is not ill-appearing or toxic-appearing.   HENT:      Mouth/Throat:      Mouth: Mucous membranes are moist.      Pharynx: Oropharynx is clear.   Eyes:      General: No scleral icterus.     Conjunctiva/sclera: Conjunctivae normal.   Cardiovascular:      Rate and Rhythm: Bradycardia present. Rhythm irregular.      Pulses: Normal pulses.      Heart sounds: No murmur heard.     No friction rub. No gallop.   Pulmonary:      Effort: Pulmonary effort is normal. No respiratory distress.      Breath sounds: Normal breath sounds. No stridor. No wheezing, rhonchi or rales.   Chest:      Chest wall: No tenderness.   Abdominal:      General: Bowel sounds are normal. There is distension. There is no abdominal bruit. There are no signs of injury.      Palpations: There is hepatomegaly. There is no shifting dullness, fluid wave, mass or pulsatile mass.      Tenderness: There is no abdominal tenderness. There is no right CVA tenderness, left CVA tenderness, guarding or rebound.      Hernia: A hernia (umbilical) is present. Hernia is present in the umbilical area.   Musculoskeletal:         General: Normal range of motion.      Cervical back: Normal range of motion.    Lymphadenopathy:      Cervical: No cervical adenopathy.   Skin:     General: Skin is warm and dry.   Neurological:      General: No focal deficit present.      Mental Status: He is alert and oriented to person, place, and time. Mental status is at baseline.   Psychiatric:         Mood and Affect: Mood normal.         Behavior: Behavior normal.          Labs:   Lab Results   Component Value Date    WBC 6.5 02/17/2023    HGB 14.3 02/17/2023    HCT 42.4 02/17/2023    MCV 94.0 02/17/2023     02/17/2023       CMP  Sodium Level   Date Value Ref Range Status   02/17/2023 140 136 - 145 mmol/L Final     Potassium Level   Date Value Ref Range Status   02/17/2023 4.1 3.5 - 5.1 mmol/L Final     Carbon Dioxide   Date Value Ref Range Status   02/17/2023 28 22 - 29 mmol/L Final     Blood Urea Nitrogen   Date Value Ref Range Status   02/17/2023 13.2 8.4 - 25.7 mg/dL Final     Creatinine   Date Value Ref Range Status   02/17/2023 1.16 0.73 - 1.18 mg/dL Final     Calcium Level Total   Date Value Ref Range Status   02/17/2023 9.4 8.4 - 10.2 mg/dL Final     Albumin Level   Date Value Ref Range Status   02/17/2023 4.0 3.5 - 5.0 g/dL Final     Bilirubin Total   Date Value Ref Range Status   02/17/2023 0.5 <=1.5 mg/dL Final     Alkaline Phosphatase   Date Value Ref Range Status   02/17/2023 100 40 - 150 unit/L Final     Aspartate Aminotransferase   Date Value Ref Range Status   02/17/2023 21 5 - 34 unit/L Final     Alanine Aminotransferase   Date Value Ref Range Status   02/17/2023 16 0 - 55 unit/L Final     eGFR   Date Value Ref Range Status   02/17/2023 >60 mls/min/1.73/m2 Final     Lab Results   Component Value Date    TSH 1.4505 07/14/2022     Hep C Ab Interp   Date Value Ref Range Status   07/14/2022 Nonreactive Nonreactive Final     Syphilis Antibody   Date Value Ref Range Status   07/14/2022 Nonreactive Nonreactive, Equivocal Final     Cholesterol Total   Date Value Ref Range Status   07/14/2022 212 (H) <=200 mg/dL Final      HDL Cholesterol   Date Value Ref Range Status   07/14/2022 43 35 - 60 mg/dL Final     Triglyceride   Date Value Ref Range Status   07/14/2022 163 (H) 34 - 140 mg/dL Final     Cholesterol/HDL Ratio   Date Value Ref Range Status   07/14/2022 5 0 - 5 Final     Very Low Density Lipoprotein   Date Value Ref Range Status   07/14/2022 33  Final     LDL Cholesterol   Date Value Ref Range Status   07/14/2022 136.00 50.00 - 140.00 mg/dL Final     Vit D 25 OH   Date Value Ref Range Status   07/14/2022 14.0 (L) 30.0 - 80.0 ng/mL Final     Results for orders placed or performed in visit on 07/14/22   CD4 Lymphocytes   Result Value Ref Range    Patient Age 56     WBC Absolute 6,900 4,500 - 11,500 /mm3    Lymph Percent 44.4 28 - 48 %    Lymph Absolute 3,063.6 1,260 - 5,520 x10(3)/mcL    CD4 % 53.0 %    CD4 Absolute 1,623.708 (H) 589 - 1,505 unit/L    T Cell Interp       Normal total lymphocyte absolute count and normal percentage.  Increased CD4+ T helper cell absolute count and normal percentage.    Caryn Howe MD       Results for orders placed or performed in visit on 02/17/23   HIV-1 RNA, Quantitative, PCR with Reflex to Genotype   Result Value Ref Range    HIV-1 RNA Detect/Quant, P Undetected Undetected copies/mL     Results for orders placed or performed in visit on 07/14/22   Quantiferon Gold TB   Result Value Ref Range    QuantiFERON-Tb Gold Plus Result Negative Negative    TB1 Ag minus Nil Result 0.06 IU/mL    TB2 Ag minus Nil Result 0.00 IU/mL    Mitogen minus Nil Result >10.00 IU/mL    Nil Result 0.02 IU/mL     No results found for this or any previous visit.  Results for orders placed or performed in visit on 07/14/22   Urinalysis   Result Value Ref Range    Color, UA Light-Yellow (A) Yellow, Colorless, Other, Clear    Appearance, UA Clear Clear    Specific Gravity, UA 1.022     pH, UA 6.0 5.0, 5.5, 6.0, 6.5, 7.0, 7.5, 8.0, 8.5    Protein, UA Negative Negative, 300  mg/dL    Glucose, UA Normal Negative,  Normal mg/dL    Ketones, UA Negative Negative, +1, +2, +3, +4, +5, >=160, >=80 mg/dL    Blood, UA Negative Negative unit/L    Bilirubin, UA Negative Negative mg/dL    Urobilinogen, UA Normal 0.2, 1.0, Normal mg/dL    Nitrites, UA Negative Negative    Leukocyte Esterase, UA Negative Negative, 75  unit/L    WBC, UA 0-5 None Seen, 0-2, 3-5, 0-5 /HPF    Bacteria, UA None Seen None Seen /HPF    Squamous Epithelial Cells, UA Trace (A) None Seen /HPF    Mucous, UA Trace (A) None Seen /LPF    Hyaline Casts, UA None Seen None Seen /lpf    RBC, UA 0-5 None Seen, 0-2, 3-5, 0-5 /HPF       Imaging: Reviewed most recent relevant imaging studies available, notable results highlighted in this note    Medications:     Current Outpatient Medications   Medication Instructions    amLODIPine (NORVASC) 10 mg, Oral, Daily    atorvastatin (LIPITOR) 10 mg, Oral, Nightly    ergocalciferol (ERGOCALCIFEROL) 50,000 Units, Oral, Every 7 days    omeprazole (PRILOSEC) 20 MG capsule TAKE ONE CAPSULE BY MOUTH DAILY    sertraline (ZOLOFT) 150 mg, Oral, Daily    TRIUMEQ 600- mg Tab 1 tablet, Oral, Daily       Assessment:       Problem List Items Addressed This Visit    None  Visit Diagnoses       HIV disease    -  Primary    Relevant Medications    TRIUMEQ 600- mg Tab    Other Relevant Orders    Quantiferon Gold TB    Hepatitis C Antibody    TSH    SYPHILIS ANTIBODY (WITH REFLEX RPR)    Chlamydia/GC, PCR    Urinalysis    Comprehensive Metabolic Panel    CBC Auto Differential    HIV-1 RNA, Quantitative, PCR with Reflex to Genotype    Hepatitis A antibody, IgG    Hepatitis B Surface Ab, Qualitative    CD4 T-[place] Cells    Major depressive disorder, single episode, unspecified        Relevant Medications    sertraline (ZOLOFT) 100 MG tablet    Unspecified abdominal pain        Vitamin D deficiency        Relevant Medications    ergocalciferol (ERGOCALCIFEROL) 50,000 unit Cap    Other Relevant Orders    Vitamin D    Hyperlipidemia,  unspecified hyperlipidemia type        Relevant Medications    atorvastatin (LIPITOR) 10 MG tablet    Other Relevant Orders    Hemoglobin A1C    Lipid Panel    Right hip pain        Relevant Orders    X-Ray Hip 2 or 3 views Right (with Pelvis when performed)    Primary hypertension        Relevant Medications    amLODIPine (NORVASC) 10 MG tablet    Irregular heart rate        Relevant Orders    IN OFFICE EKG 12-LEAD (to [place])    Ambulatory referral/consult to Cardiology    Abdominal distension        Relevant Orders    CT Chest Abdomen Pelvis W W/O Contrast (XPD)    Hepatomegaly        Relevant Orders    CT Chest Abdomen Pelvis W W/O Contrast (XPD)    PAC (premature atrial contraction)        Sinus bradycardia by electrocardiogram                   Plan:      HIV disease  -     TRIUMEQ 600- mg Tab; Take 1 tablet by mouth once daily.  Dispense: 90 tablet; Refill: 1  -     Quantiferon Gold TB; Future; Expected date: 03/05/2024  -     Hepatitis C Antibody; Future; Expected date: 03/05/2024  -     TSH; Future; Expected date: 03/05/2024  -     SYPHILIS ANTIBODY (WITH REFLEX RPR); Future; Expected date: 03/05/2024  -     Chlamydia/GC, PCR  -     Urinalysis  -     Comprehensive Metabolic Panel  -     CBC Auto Differential; Future; Expected date: 03/05/2024  -     HIV-1 RNA, Quantitative, PCR with Reflex to Genotype; Future; Expected date: 03/05/2024  -     Hepatitis A antibody, IgG; Future; Expected date: 03/05/2024  -     Hepatitis B Surface Ab, Qualitative; Future; Expected date: 03/05/2024  -     CD4 T-[place] Cells; Future; Expected date: 03/05/2024  Adherence and sexual health counseling done.  Use condoms for all sexual encounters.  Blood precautions.   Continue Triumeq 1 po daily as prescribed.  Labs today.  RTC 2-3 weeks with Katelyn.  Pt needs f/u with PCP, please assist with scheduling same.      HIV Wellness:  Anal pap: 3/21 QNS, declines 8/30/22  Oral CT/GC: 5/19 Neg  Anal CT/GC: 5/19 Neg  Urine CT/GC: 7/22  Neg, 3/24  RPR: 7/22  NR, 3/24  Ophth: 9/22  DEXA: 2/16 -1.1, 9/22 -1.4   Colon Cancer Screen: 11/2016 Dr. Izzy cosme-scope with polypectomy; mother with history of colon cancer    Major depressive disorder, single episode, unspecified  -     sertraline (ZOLOFT) 100 MG tablet; Take 1.5 tablets (150 mg total) by mouth once daily.  Dispense: 135 tablet; Refill: 3  Resume Zoloft as prescribed.     Vitamin D deficiency  -     ergocalciferol (ERGOCALCIFEROL) 50,000 unit Cap; Take 1 capsule (50,000 Units total) by mouth every 7 days.  Dispense: 12 capsule; Refill: 3  -     Vitamin D; Future; Expected date: 03/05/2024  Check level today.   Resume Ergocalciferol 88846 weekly as prescribed.    Hyperlipidemia, unspecified hyperlipidemia type  -     atorvastatin (LIPITOR) 10 MG tablet; Take 1 tablet (10 mg total) by mouth every evening.  Dispense: 90 tablet; Refill: 3  -     Hemoglobin A1C; Future; Expected date: 03/05/2024  -     Lipid Panel; Future; Expected date: 03/05/2024  Continue atorvastatin 10 mg daily.  Fasting lipid panel today.     Right hip pain  -     X-Ray Hip 2 or 3 views Right (with Pelvis when performed); Future; Expected date: 03/05/2024  XR right hip to be completed this week.    Primary hypertension  -     amLODIPine (NORVASC) 10 MG tablet; Take 1 tablet (10 mg total) by mouth once daily.  Dispense: 90 tablet; Refill: 3  Amlodipine 10 mg daily.  Medication compliance encouraged.  BP goal <130/80. Monitor BP at home & keep log of readings.  Low sodium diet, max 2 grams per day.  Weight control recommended.  Avoid illicit drug use and excessive alcohol intake.  Increase exercise activity, goal 30 minutes moderate exertion 3-5 times per week.  Stress reduction strategies such as meditation, deep breathing, stretching, prayer, social support system.     Irregular heart rate  Blocked PAC (premature atrial contraction)  Sinus bradycardia by electrocardiogram  -     IN OFFICE EKG 12-LEAD (to [place])  -      Ambulatory referral/consult to Cardiology; Future; Expected date: 03/12/2024  Refer to cardiology for further evaluation & treatment.     Hepatomegaly  Abdominal distension  Unspecified abdominal pain  -     CT Chest Abdomen Pelvis W W/O Contrast (XPD); Future; Expected date: 03/12/2024  CT abd & pelvis with & w/o contrast asap, scheduled 3/12/24.  Stressed importance of attending as scheduled.   RTC 3/21/24 for results.           I spent a total of  52  minutes on the day of the visit.  This includes face to face time and non-face to face time preparing to see the patient (eg, review of tests), obtaining and/or reviewing separately obtained history, documenting clinical information in the electronic or other health record, independently interpreting results and communicating results to the patient/family/caregiver, or care coordinator.    Visit today included increased complexity associated with the care of the episodic problem hepatomegaly & cardiac arrhythmia addressed and managing the longitudinal care of the patient due to the serious and/or complex managed problem(s) arrhythmia, abdominal distension & hepatomegaly with 1st degree relative colon cancer.

## 2024-03-05 NOTE — PROGRESS NOTES
CT abd/Pelv scheduled for 03/12/2024 at 1:00p.m. arrive at 12:30a.m. Instructed to fast x 4 hours prior.

## 2024-03-06 LAB
CD3 CELLS # BLD: 2139 CELLS/MCL (ref 550–2202)
CD3 CELLS NFR BLD: 82 % (ref 58–86)
CD3+CD4+ CELLS # BLD: 1100 CELLS/MCL (ref 365–1437)
CD3+CD4+ CELLS NFR BLD: 42 % (ref 32–64)
CD3+CD4+ CELLS/CD3+CD8+ CLL BLD: 1.1 %
CD3+CD8+ CELLS # BLD: 993 CELLS/MCL (ref 80–846)
CD3+CD8+ CELLS NFR BLD: 38 % (ref 8–40)
CD45 CELLS # BLD: 2.6 THOU/MCL (ref 0.82–2.84)

## 2024-03-07 LAB
BACTERIA UR CULT: NORMAL
GAMMA INTERFERON BACKGROUND BLD IA-ACNC: 0.02 IU/ML
HIV1 RNA # PLAS NAA DL=20: 331 COPIES/ML
M TB IFN-G BLD-IMP: NEGATIVE
M TB IFN-G CD4+ BCKGRND COR BLD-ACNC: 0.01 IU/ML
M TB IFN-G CD4+CD8+ BCKGRND COR BLD-ACNC: 0.01 IU/ML
MITOGEN IGNF BCKGRD COR BLD-ACNC: >10 IU/ML

## 2024-03-12 ENCOUNTER — HOSPITAL ENCOUNTER (OUTPATIENT)
Dept: RADIOLOGY | Facility: HOSPITAL | Age: 59
Discharge: HOME | End: 2024-03-12
Attending: NURSE PRACTITIONER
Payer: COMMERCIAL

## 2024-03-12 ENCOUNTER — TELEPHONE (OUTPATIENT)
Dept: INFECTIOUS DISEASES | Facility: CLINIC | Age: 59
End: 2024-03-12
Payer: COMMERCIAL

## 2024-03-12 DIAGNOSIS — E27.8 OTHER SPECIFIED DISORDERS OF ADRENAL GLAND: Primary | ICD-10-CM

## 2024-03-12 DIAGNOSIS — R14.0 ABDOMINAL DISTENSION: ICD-10-CM

## 2024-03-12 DIAGNOSIS — R16.0 HEPATOMEGALY: ICD-10-CM

## 2024-03-12 PROCEDURE — 25500020 PHARM REV CODE 255: Performed by: NURSE PRACTITIONER

## 2024-03-12 PROCEDURE — 74178 CT ABD&PLV WO CNTR FLWD CNTR: CPT | Mod: TC

## 2024-03-12 RX ADMIN — IOHEXOL 100 ML: 350 INJECTION, SOLUTION INTRAVENOUS at 02:03

## 2024-03-12 NOTE — TELEPHONE ENCOUNTER
CT abd/pelvis results reviewed.  Colon appears normal on scan, but colonoscopy still strongly recommended.  However, there is an incidental finding of 1.6 cm x 1.9 cm left adrenal nodule that is deemed indeterminate. Additional imaging recommended.  I attempted to phone pt with results. No answer. Left message on  to return call. Order placed for CT with adrenal protocol.

## 2024-03-13 NOTE — TELEPHONE ENCOUNTER
Phoned patient. Discussed:  CT abd/pelvis results reviewed.  Colon appears normal on scan, but colonoscopy still strongly recommended.  However, there is an incidental finding of 1.6 cm x 1.9 cm left adrenal nodule that is deemed indeterminate. Additional imaging recommended.  I attempted to phone pt with results. No answer. Left message on  to return call. Order placed for CT with adrenal protocol.    Voiced understanding and appreciates call.

## 2024-03-21 ENCOUNTER — OFFICE VISIT (OUTPATIENT)
Dept: INFECTIOUS DISEASES | Facility: CLINIC | Age: 59
End: 2024-03-21
Payer: COMMERCIAL

## 2024-03-21 VITALS
BODY MASS INDEX: 32.18 KG/M2 | HEIGHT: 72 IN | DIASTOLIC BLOOD PRESSURE: 70 MMHG | TEMPERATURE: 99 F | RESPIRATION RATE: 16 BRPM | WEIGHT: 237.56 LBS | SYSTOLIC BLOOD PRESSURE: 150 MMHG | HEART RATE: 80 BPM

## 2024-03-21 DIAGNOSIS — I10 PRIMARY HYPERTENSION: ICD-10-CM

## 2024-03-21 DIAGNOSIS — E78.00 PURE HYPERCHOLESTEROLEMIA: ICD-10-CM

## 2024-03-21 DIAGNOSIS — E55.9 VITAMIN D DEFICIENCY: ICD-10-CM

## 2024-03-21 DIAGNOSIS — Z80.0 FAMILY HISTORY OF COLON CANCER IN MOTHER: ICD-10-CM

## 2024-03-21 DIAGNOSIS — B20 HIV DISEASE: Primary | ICD-10-CM

## 2024-03-21 DIAGNOSIS — E27.8 ADRENAL NODULE: ICD-10-CM

## 2024-03-21 DIAGNOSIS — M25.552 LEFT HIP PAIN: ICD-10-CM

## 2024-03-21 DIAGNOSIS — Z86.010 HISTORY OF COLON POLYPS: ICD-10-CM

## 2024-03-21 PROCEDURE — 99215 OFFICE O/P EST HI 40 MIN: CPT | Mod: S$PBB,,, | Performed by: NURSE PRACTITIONER

## 2024-03-21 PROCEDURE — 99214 OFFICE O/P EST MOD 30 MIN: CPT | Mod: PBBFAC | Performed by: NURSE PRACTITIONER

## 2024-03-21 NOTE — PROGRESS NOTES
Patient ID: Curry Bello 58 y.o.     Chief Complaint:   Chief Complaint   Patient presents with    Followup HIV     Denies problems        HPI:    3/21/24  Mr. Zapien is a 57 yo AAM here today for HIV f/u visit.  He has been taking Triumeq daily since last visit & is tolerating it well. Labs 3/5/24 , CD4 1100. BP is improved, not quite at goal yet. He has not yet received call to schedule visit with cardiology, will assist with same.  CT chest/abd/pelvis results reviewed in depth with pt. Will plan for annual LDCT lung cancer screening.  Hx of smoking 1 ppd of cigarettes x 25 years, quit 2015.Acknowledges that he had not been taking atorvastatin consistently over past year, but has recently resumed. Will continue to follow.  He had noted acute umbilical pain with certain straining/lifting activity a couple of years ago, but has not bothered him since. Will continue to monitor for periumbilical hernia.  Order has already been placed for adrenal CT with contrast, but has not been scheduled as of yet. Will assist with same today. He also has not had XR of hip done yet, will come in tomorrow for same. He did not reach out to Dr. Magana's office for repeat colonoscopy, will place referral order as requested. All questions answered & concerns addressed.    3/5/24  Curry is a 57 yo AAM presenting today for HIV return visit, last seen 1 year ago. He tells me that his wife had a stroke January 2023 and he has been busy caring for her while still working as well.  He has been off Triumeq since end of October due to lack of f/u. Last labs 2/23 VL UD, 7/22 Cd4 1624. He tells me that he is still taking atorvastatin 10 mg daily as previously prescribed. He is fasting this am for labs. Heart rate is irregular. Denies any known history of same. Denies chest pain, palpitations, shortness of breath, lightheadedness. BP is elevated. States that he was previously on Diovan several years ago, but it made him feel bad & he  stopped taking it. Will try amlodipine 10 mg daily, amenable to same. He is taking otc vitamin d3, will check level today for same. Prefers to resume weekly dose of ergocalciferol, prescription sent. He tells me that he has noted abdominal distension and right sided pain. States that he is having regular bowel movements. Denies constipation, diarrhea, bloody, dark, tarry, klaudia colored stools. No jaundice or icterus. Abdomen is firm & distended. Family history (mother) of colon cancer, has not followed up with GI for repeat colonoscopy.  Last c-scope 11/2016 with polypectomy. CT abd & pelvis ordered for imaging. He continues with right hip pain for several years now. Did not have xray performed as ordered last visit. Will order again & patient agrees to have done this week. He is also noting sharp pain to right arm shooting to tip of middle finger with stretching/reaching motion, originating over skin graft area. Will try topical OTC pain relief cream pending further workup given complexity of presentation today. Voiced understanding. He was previously followed by Gladis Abdalla NP in [facility] [place], but has not been seen in several years. Will refer back to her as well.  All questions answered & concerns addressed.     2/17/23  Curry is a 58 yo AAM here today for HIV f/u visit.  He takes Triumeq daily and denies any interruptions. Labs 7/22 VL UD, CD4 1623.  He has increased water intake & creatinine was improved with 8/22 labs.  Will repeat today as well.  He is taking vitamin d weekly as prescribed last visit, will check level today.  Tolerated 1st dose of Shingrix well, appreciates 2nd dose today. Dexa completed 9/22 Tscore -1.4 right femur. He tells me that he has been having lower back pain and left hip pain for at least a year now.  Some days are worse than others. Denies numbness or tingling, no change in bowel/bladder patterns.  Will return next week for Xrays as ordered. He states that he received call to  schedule colonoscopy but did not return call.  He agrees to call Dr. Magana's office for same. All questions answered & concerns addressed.              Past Medical History:   Diagnosis Date    Depression     GERD (gastroesophageal reflux disease)     HIV infection     Hyperlipidemia     Personal history of colonic polyps 11/28/2016    [facility]- Dr. Oliverio Magana        Past Surgical History:   Procedure Laterality Date    COLONOSCOPY W/ POLYPECTOMY  11/28/2016    [facility]- Dr. Oliverio Magana        Social History     Socioeconomic History    Marital status:    Tobacco Use    Smoking status: Never    Smokeless tobacco: Never   Substance and Sexual Activity    Alcohol use: Never    Drug use: Never    Sexual activity: Not Currently     Birth control/protection: Condom        History reviewed. No pertinent family history.     Review of patient's allergies indicates:   Allergen Reactions    Citrus and derivatives         Immunization History   Administered Date(s) Administered    COVID-19, vector-nr, rS-Ad26, PF (Steve) 04/09/2021    Hepatitis A / Hepatitis B 09/03/2015, 10/01/2015, 03/14/2016    Influenza - Quadrivalent 11/09/2016    Influenza - Quadrivalent - PF *Preferred* (6 months and older) 10/01/2022, 10/26/2023    Meningococcal Conjugate (MCV4P) 07/02/2018, 05/31/2019    Pneumococcal Conjugate - 13 Valent 09/03/2015    Pneumococcal Polysaccharide - 23 Valent 11/12/2015, 03/22/2021    Tdap 10/01/2015    Zoster Recombinant 08/30/2022, 02/17/2023        Review of Systems   Constitutional: Negative.    HENT: Negative.     Eyes: Negative.    Respiratory: Negative.     Cardiovascular: Negative.    Gastrointestinal: Negative.    Genitourinary: Negative.    Musculoskeletal:  Positive for joint pain (left hip).   Skin: Negative.    Neurological: Negative.    Endo/Heme/Allergies: Negative.    Psychiatric/Behavioral: Negative.     All other systems  reviewed and are negative.         Objective:      BP (!) 150/70   Pulse 80   Temp 98.5 °F (36.9 °C) (Oral)   Resp 16   Ht 6' (1.829 m)   Wt 107.7 kg (237 lb 8.7 oz)   BMI 32.22 kg/m²      Physical Exam     Labs:   Lab Results   Component Value Date    WBC 7.31 03/05/2024    HGB 15.2 03/05/2024    HCT 45.1 03/05/2024    MCV 89.7 03/05/2024     03/05/2024       CMP  Sodium Level   Date Value Ref Range Status   03/05/2024 139 136 - 145 mmol/L Final     Potassium Level   Date Value Ref Range Status   03/05/2024 3.8 3.5 - 5.1 mmol/L Final     Carbon Dioxide   Date Value Ref Range Status   03/05/2024 24 23 - 31 mmol/L Final     Blood Urea Nitrogen   Date Value Ref Range Status   03/05/2024 13.2 8.4 - 25.7 mg/dL Final     Creatinine   Date Value Ref Range Status   03/05/2024 1.08 0.73 - 1.18 mg/dL Final     Calcium Level Total   Date Value Ref Range Status   03/05/2024 8.8 8.8 - 10.0 mg/dL Final     Albumin Level   Date Value Ref Range Status   03/05/2024 3.8 3.4 - 4.8 g/dL Final     Bilirubin Total   Date Value Ref Range Status   03/05/2024 0.3 <=1.5 mg/dL Final     Alkaline Phosphatase   Date Value Ref Range Status   03/05/2024 108 40 - 150 unit/L Final     Aspartate Aminotransferase   Date Value Ref Range Status   03/05/2024 28 5 - 34 unit/L Final     Alanine Aminotransferase   Date Value Ref Range Status   03/05/2024 21 0 - 55 unit/L Final     eGFR   Date Value Ref Range Status   03/05/2024 >60 mls/min/1.73/m2 Final     Lab Results   Component Value Date    TSH 1.987 03/05/2024     Hep C Ab Interp   Date Value Ref Range Status   03/05/2024 Nonreactive Nonreactive Final     Syphilis Antibody   Date Value Ref Range Status   03/05/2024 Nonreactive Nonreactive, Equivocal Final     Cholesterol Total   Date Value Ref Range Status   03/05/2024 196 <=200 mg/dL Final     HDL Cholesterol   Date Value Ref Range Status   03/05/2024 41 35 - 60 mg/dL Final     Triglyceride   Date Value Ref Range Status   03/05/2024 117  34 - 140 mg/dL Final     Cholesterol/HDL Ratio   Date Value Ref Range Status   03/05/2024 5 0 - 5 Final     Very Low Density Lipoprotein   Date Value Ref Range Status   03/05/2024 23  Final     LDL Cholesterol   Date Value Ref Range Status   03/05/2024 132.00 50.00 - 140.00 mg/dL Final     Vit D 25 OH   Date Value Ref Range Status   03/05/2024 13.5 (L) 30.0 - 80.0 ng/mL Final     Results for orders placed or performed in visit on 07/14/22   CD4 Lymphocytes   Result Value Ref Range    Patient Age 56     WBC Absolute 6,900 4,500 - 11,500 /mm3    Lymph Percent 44.4 28 - 48 %    Lymph Absolute 3,063.6 1,260 - 5,520 x10(3)/mcL    CD4 % 53.0 %    CD4 Absolute 1,623.708 (H) 589 - 1,505 unit/L    T Cell Interp       Normal total lymphocyte absolute count and normal percentage.  Increased CD4+ T helper cell absolute count and normal percentage.    Caryn Howe MD       Results for orders placed or performed in visit on 03/05/24   HIV-1 RNA, Quantitative, PCR with Reflex to Genotype   Result Value Ref Range    HIV-1 RNA Detect/Quant, P 331 (A) Undetected copies/mL     Results for orders placed or performed in visit on 03/05/24   Quantiferon Gold TB   Result Value Ref Range    QuantiFERON-Tb Gold Plus Result Negative Negative    TB1 Ag minus Nil Result 0.01 IU/mL    TB2 Ag minus Nil Result 0.01 IU/mL    Mitogen minus Nil Result >10.00 IU/mL    Nil Result 0.02 IU/mL     No results found for this or any previous visit.  Results for orders placed or performed in visit on 03/05/24   Urinalysis   Result Value Ref Range    Color, UA Yellow Yellow, Light-Yellow, Dark Yellow, Theresa, Straw    Appearance, UA Clear Clear    Specific Gravity, UA 1.029 1.005 - 1.030    pH, UA 5.5 5.0 - 8.5    Protein, UA Trace (A) Negative    Glucose, UA Normal Negative, Normal    Ketones, UA Negative Negative    Blood, UA Negative Negative    Bilirubin, UA Negative Negative    Urobilinogen, UA 1+ (A) 0.2, 1.0, Normal    Nitrites, UA Negative  Negative    Leukocyte Esterase, UA 75 (A) Negative    WBC, UA 11-20 (A) None Seen, 0-2, 3-5, 0-5 /HPF    Bacteria, UA None Seen None Seen /HPF    Squamous Epithelial Cells, UA Trace (A) None Seen /HPF    Mucous, UA Trace (A) None Seen /LPF    Hyaline Casts, UA None Seen None Seen /lpf    RBC, UA 0-5 None Seen, 0-2, 3-5, 0-5 /HPF       Imaging: Reviewed most recent relevant imaging studies available, notable results highlighted in this note    Medications:     Current Outpatient Medications   Medication Instructions    amLODIPine (NORVASC) 10 mg, Oral, Daily    atorvastatin (LIPITOR) 10 mg, Oral, Nightly    ergocalciferol (ERGOCALCIFEROL) 50,000 Units, Oral, Every 7 days    omeprazole (PRILOSEC) 20 MG capsule TAKE ONE CAPSULE BY MOUTH DAILY    sertraline (ZOLOFT) 150 mg, Oral, Daily    TRIUMEQ 600- mg Tab 1 tablet, Oral, Daily       Assessment:       Problem List Items Addressed This Visit    None  Visit Diagnoses       HIV disease    -  Primary    Pure hypercholesterolemia        History of colon polyps        Relevant Orders    Ambulatory referral/consult to Gastroenterology    Family history of colon cancer in mother        Relevant Orders    Ambulatory referral/consult to Gastroenterology    Primary hypertension        Adrenal nodule        Left hip pain        Vitamin D deficiency                   Plan:      HIV disease  Adherence and sexual health counseling done.  Use condoms for all sexual encounters.  Blood precautions.   Continue Triumeq 1 po daily as prescribed.  RTC 1 month with Katelyn.  Pt needs f/u with PCP, please assist with scheduling same.      HIV Wellness:  Anal pap: 3/21 QNS, declines 8/30/22  Oral CT/GC: 5/19 Neg  Anal CT/GC: 5/19 Neg  Urine CT/GC: 7/22 Neg, 3/24 Neg  RPR: 7/22  NR, 3/24 NR  Ophth: 9/22  DEXA: 2/16 -1.1, 9/22 -1.4   Colon Cancer Screen: 11/2016 Dr. Magana c-scope with polypectomy; mother with history of colon cancer    Pure hypercholesterolemia  Continue atorvastatin  10 mg daily.   Will repeat lipids in 6 months.   Decrease intake of fried & greasy foods.    Increase intake of Omega 3 rich foods in diet such as fatty fish, nuts, avocado, etc.  Avoid trans fat in diet, commonly found in packaged foods such as snacks/cakes/cookies.  Increase fiber intake.   Increase exercise to at least 30 minutes of moderate activity 3-5 days per week.     History of colon polyps  -     Ambulatory referral/consult to Gastroenterology; Future; Expected date: 03/28/2024  Refer to Dr. Magana's office for repeat colonoscopy.     Family history of colon cancer in mother  -     Ambulatory referral/consult to Gastroenterology; Future; Expected date: 03/28/2024  Refer to Dr. Magana's office for repeat colonoscopy.     Primary hypertension  Improved.  Continue Amlodipine 10 mg daily.  Medication compliance encouraged.  BP goal <130/80. Monitor BP at home & keep log of readings.  Low sodium diet, max 2 grams per day.  Weight control recommended.  Avoid illicit drug use and excessive alcohol intake.  Increase exercise activity, goal 30 minutes moderate exertion 3-5 times per week.  Stress reduction strategies such as meditation, deep breathing, stretching, prayer, social support system.     Adrenal nodule  CT abd with contrast, adrenal protocol as previously ordered. Please ensure that it is scheduled.     Left hip pain  Pt to return for XR 3/22/24.    Vitamin D deficiency  Continue Ergocalciferol 18183 weekly as prescribed.

## 2024-03-27 ENCOUNTER — HOSPITAL ENCOUNTER (OUTPATIENT)
Dept: RADIOLOGY | Facility: HOSPITAL | Age: 59
Discharge: HOME | End: 2024-03-27
Attending: NURSE PRACTITIONER
Payer: COMMERCIAL

## 2024-03-27 DIAGNOSIS — E27.8 OTHER SPECIFIED DISORDERS OF ADRENAL GLAND: ICD-10-CM

## 2024-03-27 PROCEDURE — 25500020 PHARM REV CODE 255

## 2024-03-27 PROCEDURE — 74178 CT ABD&PLV WO CNTR FLWD CNTR: CPT | Mod: TC

## 2024-03-27 PROCEDURE — 74170 CT ABD WO CNTRST FLWD CNTRST: CPT | Mod: TC

## 2024-03-27 RX ADMIN — IOHEXOL 100 ML: 350 INJECTION, SOLUTION INTRAVENOUS at 07:03

## 2024-04-16 DIAGNOSIS — E27.8 ADRENAL MASS 1 CM TO 4 CM IN DIAMETER WITH NO HISTORY OF MALIGNANT NEOPLASM: ICD-10-CM

## 2024-04-16 DIAGNOSIS — E27.8 OTHER SPECIFIED DISORDERS OF ADRENAL GLAND: Primary | ICD-10-CM

## 2024-04-16 NOTE — PROGRESS NOTES
CT abd inconclusive regarding adrenal mass, MRI recommended. Phoned pt with results and plan. MRI order placed. Thank you.

## 2024-04-17 DIAGNOSIS — E27.8 OTHER SPECIFIED DISORDERS OF ADRENAL GLAND: Primary | ICD-10-CM

## 2024-04-29 ENCOUNTER — LAB VISIT (OUTPATIENT)
Dept: LAB | Facility: HOSPITAL | Age: 59
End: 2024-04-29
Attending: NURSE PRACTITIONER
Payer: COMMERCIAL

## 2024-04-29 ENCOUNTER — OFFICE VISIT (OUTPATIENT)
Dept: INFECTIOUS DISEASES | Facility: CLINIC | Age: 59
End: 2024-04-29
Payer: COMMERCIAL

## 2024-04-29 VITALS
TEMPERATURE: 100 F | SYSTOLIC BLOOD PRESSURE: 120 MMHG | RESPIRATION RATE: 16 BRPM | HEIGHT: 72 IN | WEIGHT: 226.5 LBS | HEART RATE: 63 BPM | DIASTOLIC BLOOD PRESSURE: 73 MMHG | BODY MASS INDEX: 30.68 KG/M2

## 2024-04-29 DIAGNOSIS — B20 HIV DISEASE: Primary | ICD-10-CM

## 2024-04-29 DIAGNOSIS — B20 HIV DISEASE: ICD-10-CM

## 2024-04-29 DIAGNOSIS — E27.8 ADRENAL MASS 1 CM TO 4 CM IN DIAMETER WITH NO HISTORY OF MALIGNANT NEOPLASM: ICD-10-CM

## 2024-04-29 DIAGNOSIS — R10.9 UNSPECIFIED ABDOMINAL PAIN: ICD-10-CM

## 2024-04-29 DIAGNOSIS — E27.8 OTHER SPECIFIED DISORDERS OF ADRENAL GLAND: ICD-10-CM

## 2024-04-29 LAB
ANION GAP SERPL CALC-SCNC: 8 MEQ/L
BUN SERPL-MCNC: 14.7 MG/DL (ref 8.4–25.7)
CALCIUM SERPL-MCNC: 9.9 MG/DL (ref 8.8–10)
CHLORIDE SERPL-SCNC: 103 MMOL/L (ref 98–107)
CO2 SERPL-SCNC: 26 MMOL/L (ref 23–31)
CREAT SERPL-MCNC: 1.28 MG/DL (ref 0.73–1.18)
CREAT/UREA NIT SERPL: 11
GFR SERPLBLD CREATININE-BSD FMLA CKD-EPI: >60 MLS/MIN/1.73/M2
GLUCOSE SERPL-MCNC: 97 MG/DL (ref 82–115)
HBV SURFACE AB SER-ACNC: 22.84 MIU/ML
HBV SURFACE AB SERPL IA-ACNC: REACTIVE M[IU]/ML
POTASSIUM SERPL-SCNC: 3.8 MMOL/L (ref 3.5–5.1)
SODIUM SERPL-SCNC: 137 MMOL/L (ref 136–145)

## 2024-04-29 PROCEDURE — 1160F RVW MEDS BY RX/DR IN RCRD: CPT | Mod: CPTII,,, | Performed by: NURSE PRACTITIONER

## 2024-04-29 PROCEDURE — 36415 COLL VENOUS BLD VENIPUNCTURE: CPT

## 2024-04-29 PROCEDURE — 4010F ACE/ARB THERAPY RXD/TAKEN: CPT | Mod: CPTII,,, | Performed by: NURSE PRACTITIONER

## 2024-04-29 PROCEDURE — 99214 OFFICE O/P EST MOD 30 MIN: CPT | Mod: S$PBB,,, | Performed by: NURSE PRACTITIONER

## 2024-04-29 PROCEDURE — 3008F BODY MASS INDEX DOCD: CPT | Mod: CPTII,,, | Performed by: NURSE PRACTITIONER

## 2024-04-29 PROCEDURE — 80048 BASIC METABOLIC PNL TOTAL CA: CPT

## 2024-04-29 PROCEDURE — 3044F HG A1C LEVEL LT 7.0%: CPT | Mod: CPTII,,, | Performed by: NURSE PRACTITIONER

## 2024-04-29 PROCEDURE — 1159F MED LIST DOCD IN RCRD: CPT | Mod: CPTII,,, | Performed by: NURSE PRACTITIONER

## 2024-04-29 PROCEDURE — 87536 HIV-1 QUANT&REVRSE TRNSCRPJ: CPT

## 2024-04-29 PROCEDURE — 3074F SYST BP LT 130 MM HG: CPT | Mod: CPTII,,, | Performed by: NURSE PRACTITIONER

## 2024-04-29 PROCEDURE — 3078F DIAST BP <80 MM HG: CPT | Mod: CPTII,,, | Performed by: NURSE PRACTITIONER

## 2024-04-29 PROCEDURE — 99214 OFFICE O/P EST MOD 30 MIN: CPT | Mod: PBBFAC | Performed by: NURSE PRACTITIONER

## 2024-04-29 PROCEDURE — 86706 HEP B SURFACE ANTIBODY: CPT

## 2024-04-29 RX ORDER — OMEPRAZOLE 20 MG/1
CAPSULE, DELAYED RELEASE ORAL
Qty: 90 CAPSULE | Refills: 1 | Status: SHIPPED | OUTPATIENT
Start: 2024-04-29

## 2024-04-29 RX ORDER — VALSARTAN 40 MG/1
TABLET ORAL
COMMUNITY

## 2024-04-29 NOTE — PROGRESS NOTES
Patient ID: Curry Bello 58 y.o.     Chief Complaint:   Chief Complaint   Patient presents with    Followup HIV     Denies problems        HPI:    4/29/24  Mr. Zapien is a 59 yo AAM presenting today for f/u visit. He is taking Triumeq daily without interruption. Discussed inconclusive reports of abacavir associations with increase for cardiac associated risks. Desires to continue with Triumeq daily. Labs 3/5/24 , CD4 1100.  He is immune to Hep A. Will add Hep B s ab to labs today and repeat VL as well. He tells me that he missed MRI of abd with adrenal protocol as ordered under recommendation of radiologist for further evaluation of adrenal lesion. Will assist with rescheduling, as he states that he called to r/s but did not receive a return call. He is scheduled for colonoscopy with Dr. Magana as referred last visit. BP at goal. Now taking all medications as prescribed. All questions answered & concerns addressed.    3/21/24  Mr. Zapien is a 59 yo AAM here today for HIV f/u visit.  He has been taking Triumeq daily since last visit & is tolerating it well. Labs 3/5/24 , CD4 1100. BP is improved, not quite at goal yet. He has not yet received call to schedule visit with cardiology, will assist with same.  CT chest/abd/pelvis results reviewed in depth with pt. Will plan for annual LDCT lung cancer screening.  Hx of smoking 1 ppd of cigarettes x 25 years, quit 2015.Acknowledges that he had not been taking atorvastatin consistently over past year, but has recently resumed. Will continue to follow.  He had noted acute umbilical pain with certain straining/lifting activity a couple of years ago, but has not bothered him since. Will continue to monitor for periumbilical hernia.  Order has already been placed for adrenal CT with contrast, but has not been scheduled as of yet. Will assist with same today. He also has not had XR of hip done yet, will come in tomorrow for same. He did not reach out to   Izzy's office for repeat colonoscopy, will place referral order as requested. All questions answered & concerns addressed.     3/5/24  Curry is a 57 yo AAM presenting today for HIV return visit, last seen 1 year ago. He tells me that his wife had a stroke January 2023 and he has been busy caring for her while still working as well.  He has been off Triumeq since end of October due to lack of f/u. Last labs 2/23 VL UD, 7/22 Cd4 1624. He tells me that he is still taking atorvastatin 10 mg daily as previously prescribed. He is fasting this am for labs. Heart rate is irregular. Denies any known history of same. Denies chest pain, palpitations, shortness of breath, lightheadedness. BP is elevated. States that he was previously on Diovan several years ago, but it made him feel bad & he stopped taking it. Will try amlodipine 10 mg daily, amenable to same. He is taking otc vitamin d3, will check level today for same. Prefers to resume weekly dose of ergocalciferol, prescription sent. He tells me that he has noted abdominal distension and right sided pain. States that he is having regular bowel movements. Denies constipation, diarrhea, bloody, dark, tarry, klaudia colored stools. No jaundice or icterus. Abdomen is firm & distended. Family history (mother) of colon cancer, has not followed up with GI for repeat colonoscopy.  Last c-scope 11/2016 with polypectomy. CT abd & pelvis ordered for imaging. He continues with right hip pain for several years now. Did not have xray performed as ordered last visit. Will order again & patient agrees to have done this week. He is also noting sharp pain to right arm shooting to tip of middle finger with stretching/reaching motion, originating over skin graft area. Will try topical OTC pain relief cream pending further workup given complexity of presentation today. Voiced understanding. He was previously followed by Gladis Abdalla NP in [facility] for PCP, but has not been seen in several  years. Will refer back to her as well.  All questions answered & concerns addressed.              Past Medical History:   Diagnosis Date    Depression     GERD (gastroesophageal reflux disease)     HIV infection     Hyperlipidemia     Personal history of colonic polyps 11/28/2016    [facility]- Dr. Oliverio Magana        Past Surgical History:   Procedure Laterality Date    COLONOSCOPY W/ POLYPECTOMY  11/28/2016    [facility]- Dr. Oliverio Magana        Social History     Socioeconomic History    Marital status:    Tobacco Use    Smoking status: Never    Smokeless tobacco: Never   Substance and Sexual Activity    Alcohol use: Never    Drug use: Never    Sexual activity: Not Currently     Birth control/protection: Condom        No family history on file.     Review of patient's allergies indicates:   Allergen Reactions    Citrus and derivatives         Immunization History   Administered Date(s) Administered    COVID-19, vector-nr, rS-Ad26, PF (Foodist) 04/09/2021    Hepatitis A / Hepatitis B 09/03/2015, 10/01/2015, 03/14/2016    Influenza - Quadrivalent 11/09/2016    Influenza - Quadrivalent - PF *Preferred* (6 months and older) 10/01/2022, 10/26/2023    Meningococcal Conjugate (MCV4P) 07/02/2018, 05/31/2019    Pneumococcal Conjugate - 13 Valent 09/03/2015    Pneumococcal Polysaccharide - 23 Valent 11/12/2015, 03/22/2021    Tdap 10/01/2015    Zoster Recombinant 08/30/2022, 02/17/2023        Review of Systems   Constitutional: Negative.    HENT: Negative.     Eyes: Negative.    Respiratory: Negative.     Cardiovascular: Negative.    Gastrointestinal: Negative.    Genitourinary: Negative.    Musculoskeletal: Negative.    Skin: Negative.    Neurological: Negative.    Endo/Heme/Allergies: Negative.    Psychiatric/Behavioral: Negative.     All other systems reviewed and are negative.         Objective:      /73 (BP Location: Right arm, Patient Position:  Sitting, BP Method: Large (Automatic))   Pulse 63   Temp 99.5 °F (37.5 °C) (Oral)   Resp 16   Ht 6' (1.829 m)   Wt 102.7 kg (226 lb 8.4 oz)   BMI 30.72 kg/m²      Physical Exam  Vitals reviewed.   Constitutional:       General: He is not in acute distress.     Appearance: Normal appearance. He is not toxic-appearing.   HENT:      Mouth/Throat:      Mouth: Mucous membranes are moist.      Pharynx: Oropharynx is clear.   Eyes:      Conjunctiva/sclera: Conjunctivae normal.   Cardiovascular:      Rate and Rhythm: Normal rate and regular rhythm.   Pulmonary:      Effort: Pulmonary effort is normal. No respiratory distress.      Breath sounds: Normal breath sounds.   Abdominal:      General: Abdomen is flat. Bowel sounds are normal.      Palpations: Abdomen is soft.   Musculoskeletal:         General: Normal range of motion.      Cervical back: Normal range of motion.   Lymphadenopathy:      Cervical: No cervical adenopathy.   Skin:     General: Skin is warm and dry.   Neurological:      General: No focal deficit present.      Mental Status: He is alert and oriented to person, place, and time. Mental status is at baseline.   Psychiatric:         Mood and Affect: Mood normal.         Behavior: Behavior normal.          Labs:   Lab Results   Component Value Date    WBC 7.31 03/05/2024    HGB 15.2 03/05/2024    HCT 45.1 03/05/2024    MCV 89.7 03/05/2024     03/05/2024       CMP  Sodium Level   Date Value Ref Range Status   03/05/2024 139 136 - 145 mmol/L Final     Potassium Level   Date Value Ref Range Status   03/05/2024 3.8 3.5 - 5.1 mmol/L Final     Carbon Dioxide   Date Value Ref Range Status   03/05/2024 24 23 - 31 mmol/L Final     Blood Urea Nitrogen   Date Value Ref Range Status   03/05/2024 13.2 8.4 - 25.7 mg/dL Final     Creatinine   Date Value Ref Range Status   03/05/2024 1.08 0.73 - 1.18 mg/dL Final     Calcium Level Total   Date Value Ref Range Status   03/05/2024 8.8 8.8 - 10.0 mg/dL Final      Albumin Level   Date Value Ref Range Status   03/05/2024 3.8 3.4 - 4.8 g/dL Final     Bilirubin Total   Date Value Ref Range Status   03/05/2024 0.3 <=1.5 mg/dL Final     Alkaline Phosphatase   Date Value Ref Range Status   03/05/2024 108 40 - 150 unit/L Final     Aspartate Aminotransferase   Date Value Ref Range Status   03/05/2024 28 5 - 34 unit/L Final     Alanine Aminotransferase   Date Value Ref Range Status   03/05/2024 21 0 - 55 unit/L Final     eGFR   Date Value Ref Range Status   03/05/2024 >60 mls/min/1.73/m2 Final     Lab Results   Component Value Date    TSH 1.987 03/05/2024     Hep C Ab Interp   Date Value Ref Range Status   03/05/2024 Nonreactive Nonreactive Final     Syphilis Antibody   Date Value Ref Range Status   03/05/2024 Nonreactive Nonreactive, Equivocal Final     Cholesterol Total   Date Value Ref Range Status   03/05/2024 196 <=200 mg/dL Final     HDL Cholesterol   Date Value Ref Range Status   03/05/2024 41 35 - 60 mg/dL Final     Triglyceride   Date Value Ref Range Status   03/05/2024 117 34 - 140 mg/dL Final     Cholesterol/HDL Ratio   Date Value Ref Range Status   03/05/2024 5 0 - 5 Final     Very Low Density Lipoprotein   Date Value Ref Range Status   03/05/2024 23  Final     LDL Cholesterol   Date Value Ref Range Status   03/05/2024 132.00 50.00 - 140.00 mg/dL Final     Vit D 25 OH   Date Value Ref Range Status   03/05/2024 13.5 (L) 30.0 - 80.0 ng/mL Final     Results for orders placed or performed in visit on 07/14/22   CD4 Lymphocytes   Result Value Ref Range    Patient Age 56     WBC Absolute 6,900 4,500 - 11,500 /mm3    Lymph Percent 44.4 28 - 48 %    Lymph Absolute 3,063.6 1,260 - 5,520 x10(3)/mcL    CD4 % 53.0 %    CD4 Absolute 1,623.708 (H) 589 - 1,505 unit/L    T Cell Interp       Normal total lymphocyte absolute count and normal percentage.  Increased CD4+ T helper cell absolute count and normal percentage.    Caryn Howe MD       Results for orders placed or  performed in visit on 03/05/24   HIV-1 RNA, Quantitative, PCR with Reflex to Genotype   Result Value Ref Range    HIV-1 RNA Detect/Quant, P 331 (A) Undetected copies/mL     Results for orders placed or performed in visit on 03/05/24   Quantiferon Gold TB   Result Value Ref Range    QuantiFERON-Tb Gold Plus Result Negative Negative    TB1 Ag minus Nil Result 0.01 IU/mL    TB2 Ag minus Nil Result 0.01 IU/mL    Mitogen minus Nil Result >10.00 IU/mL    Nil Result 0.02 IU/mL     No results found for this or any previous visit.  Results for orders placed or performed in visit on 03/05/24   Urinalysis   Result Value Ref Range    Color, UA Yellow Yellow, Light-Yellow, Dark Yellow, Theresa, Straw    Appearance, UA Clear Clear    Specific Gravity, UA 1.029 1.005 - 1.030    pH, UA 5.5 5.0 - 8.5    Protein, UA Trace (A) Negative    Glucose, UA Normal Negative, Normal    Ketones, UA Negative Negative    Blood, UA Negative Negative    Bilirubin, UA Negative Negative    Urobilinogen, UA 1+ (A) 0.2, 1.0, Normal    Nitrites, UA Negative Negative    Leukocyte Esterase, UA 75 (A) Negative    WBC, UA 11-20 (A) None Seen, 0-2, 3-5, 0-5 /HPF    Bacteria, UA None Seen None Seen /HPF    Squamous Epithelial Cells, UA Trace (A) None Seen /HPF    Mucous, UA Trace (A) None Seen /LPF    Hyaline Casts, UA None Seen None Seen /lpf    RBC, UA 0-5 None Seen, 0-2, 3-5, 0-5 /HPF       Imaging: Reviewed most recent relevant imaging studies available, notable results highlighted in this note    Medications:     Current Outpatient Medications   Medication Instructions    amLODIPine (NORVASC) 10 mg, Oral, Daily    atorvastatin (LIPITOR) 10 mg, Oral, Nightly    ergocalciferol (ERGOCALCIFEROL) 50,000 Units, Oral, Every 7 days    omeprazole (PRILOSEC) 20 MG capsule TAKE ONE CAPSULE BY MOUTH DAILY    sertraline (ZOLOFT) 150 mg, Oral, Daily    TRIUMEQ 600- mg Tab 1 tablet, Oral, Daily    valsartan (DIOVAN) 40 MG tablet 0       Assessment:       Problem  List Items Addressed This Visit    None  Visit Diagnoses       HIV disease    -  Primary    Relevant Orders    HIV-1 RNA, Quantitative, PCR with Reflex to Genotype    Hepatitis B Surface Ab, Qualitative    Adrenal mass 1 cm to 4 cm in diameter with no history of malignant neoplasm        Relevant Orders    Basic Metabolic Panel               Plan:      HIV disease  -     HIV-1 RNA, Quantitative, PCR with Reflex to Genotype; Future; Expected date: 04/29/2024  -     Hepatitis B Surface Ab, Qualitative; Future; Expected date: 04/29/2024  Adherence and sexual health counseling done.  Use condoms for all sexual encounters.  Blood precautions.   Continue Triumeq 1 po daily as prescribed.  RTC 3 months with Katelyn.  Pt needs f/u with PCP, please assist with scheduling same.      HIV Wellness:  Anal pap: 3/21 QNS, declines 8/30/22  Oral CT/GC: 5/19 Neg  Anal CT/GC: 5/19 Neg  Urine CT/GC: 7/22 Neg, 3/24 Neg  RPR: 7/22  NR, 3/24 NR  Ophth: 9/22  DEXA: 2/16 -1.1, 9/22 -1.4   Colon Cancer Screen: 11/2016 Dr. Magana c-scope with polypectomy; mother with history of colon cancer.    Adrenal mass 1 cm to 4 cm in diameter with no history of malignant neoplasm  -     Basic Metabolic Panel; Future; Expected date: 04/29/2024  Please assist with rescheduling MRI abd adrenal protocol. Will call pt with results.     Pure hypercholesterolemia  Continue atorvastatin 10 mg daily.   Will repeat lipids in 6 months.   Decrease intake of fried & greasy foods.    Increase intake of Omega 3 rich foods in diet such as fatty fish, nuts, avocado, etc.  Avoid trans fat in diet, commonly found in packaged foods such as snacks/cakes/cookies.  Increase fiber intake.   Increase exercise to at least 30 minutes of moderate activity 3-5 days per week.      History of colon polyps  -     Ambulatory referral/consult to Gastroenterology; Future; Expected date: 03/28/2024  Keep appointment with Dr. Magana for repeat colonoscopy.      Family history of  colon cancer in mother  Keep appointment with Dr. Magana for repeat colonoscopy.      Primary hypertension  At goal.  Continue Amlodipine 10 mg daily.  Medication compliance encouraged.  BP goal <130/80. Monitor BP at home & keep log of readings.  Low sodium diet, max 2 grams per day.  Weight control recommended.  Avoid illicit drug use and excessive alcohol intake.  Increase exercise activity, goal 30 minutes moderate exertion 3-5 times per week.  Stress reduction strategies such as meditation, deep breathing, stretching, prayer, social support system.      Vitamin D deficiency  Continue Ergocalciferol 69205 weekly as prescribed.

## 2024-05-01 LAB — HIV1 RNA # PLAS NAA DL=20: NORMAL COPIES/ML

## 2024-05-16 ENCOUNTER — HOSPITAL ENCOUNTER (OUTPATIENT)
Dept: RADIOLOGY | Facility: HOSPITAL | Age: 59
Discharge: HOME | End: 2024-05-16
Attending: NURSE PRACTITIONER
Payer: COMMERCIAL

## 2024-05-16 DIAGNOSIS — E27.8 OTHER SPECIFIED DISORDERS OF ADRENAL GLAND: ICD-10-CM

## 2024-05-16 DIAGNOSIS — E27.8 ADRENAL MASS 1 CM TO 4 CM IN DIAMETER WITH NO HISTORY OF MALIGNANT NEOPLASM: Primary | ICD-10-CM

## 2024-05-16 PROCEDURE — A9577 INJ MULTIHANCE: HCPCS

## 2024-05-16 PROCEDURE — 74183 MRI ABD W/O CNTR FLWD CNTR: CPT | Mod: TC

## 2024-05-16 PROCEDURE — 25500020 PHARM REV CODE 255

## 2024-05-16 RX ADMIN — GADOBENATE DIMEGLUMINE 19 ML: 529 INJECTION, SOLUTION INTRAVENOUS at 08:05

## 2024-05-16 NOTE — PROGRESS NOTES
MRI adrenal protocol still inconclusive with regards to left adrenal mass. Precontrast CT 42 HU. Will refer to Endocrine for additional workup. Attempted to phone pt with results and plan. No answer, left message on VM to return call. Referral order placed, please ensure that it is reviewed and he is scheduled in a timely manner. Thank you.     Pt returned call & results provided. He appreciates the call & will be awaiting appointment as planned.

## 2024-05-17 ENCOUNTER — TELEPHONE (OUTPATIENT)
Dept: INFECTIOUS DISEASES | Facility: CLINIC | Age: 59
End: 2024-05-17
Payer: COMMERCIAL

## 2024-05-17 NOTE — TELEPHONE ENCOUNTER
Spoke with Barbra Amaya, PAR , regarding referral to Endocrinology and next available appt scheduling is 2-3 years out. Suggested to speak with Dr. KATHY Ryan personally for possible overbooked appt.

## 2024-05-20 ENCOUNTER — TELEPHONE (OUTPATIENT)
Dept: INFECTIOUS DISEASES | Facility: CLINIC | Age: 59
End: 2024-05-20
Payer: COMMERCIAL

## 2024-05-20 NOTE — PROGRESS NOTES
[facility] Endocrinology referrals are being scheduled 2026 at this time. Please discuss with Dr Ryan for any other options

## 2024-05-20 NOTE — TELEPHONE ENCOUNTER
Dr. Ryan,     This is a patient of mine that I referred to Endocrine clinic last week for further evaluation of an inconclusive adrenal nodule initially found on abdominal CT 3/12/24.  Initial scan described nodule as 1.6 x 1.9 cm with precontrast HU 24.4.  The CT scan was repeated with adrenal protocol on 3/27/24 and nodule was described as 1.8 x 1.7 cm with precontrast HU 42.  As recommended by radiologist, I then ordered a MRI with adrenal protocol which remains inconclusive and describes nodule up to 15 mm.   This patient is also HIV positive, but is well controlled. He is at increased risk of malignancy due to HIV status and appreciate if you can see him in clinic asap for a full endocrine work up.     Thank you,     Katelyn Abdalla NP

## 2024-05-20 NOTE — TELEPHONE ENCOUNTER
Unfortunately currently we do not have the capacity.  I'm sending this to my staff to release his referral to the general [facility] pool so someone within the system can  his referral and see him in the coming weeks.

## 2024-05-21 NOTE — TELEPHONE ENCOUNTER
I called pt and notified him that he may receive a call from an endocrinology clinic from outside of [facility]. Pt verbalizes understanding

## 2024-05-21 NOTE — TELEPHONE ENCOUNTER
Noted, thank you.     Kenneth,     Please notify patient of this as well so that he is expecting call from outside of the facility.     Katelyn

## 2024-07-29 ENCOUNTER — OFFICE VISIT (OUTPATIENT)
Dept: INFECTIOUS DISEASES | Facility: CLINIC | Age: 59
End: 2024-07-29
Payer: COMMERCIAL

## 2024-07-29 ENCOUNTER — LAB VISIT (OUTPATIENT)
Dept: LAB | Facility: HOSPITAL | Age: 59
End: 2024-07-29
Attending: NURSE PRACTITIONER
Payer: COMMERCIAL

## 2024-07-29 VITALS
TEMPERATURE: 98 F | HEIGHT: 72 IN | SYSTOLIC BLOOD PRESSURE: 119 MMHG | HEART RATE: 75 BPM | RESPIRATION RATE: 12 BRPM | DIASTOLIC BLOOD PRESSURE: 77 MMHG | WEIGHT: 228.19 LBS | BODY MASS INDEX: 30.91 KG/M2

## 2024-07-29 DIAGNOSIS — E55.9 VITAMIN D DEFICIENCY: ICD-10-CM

## 2024-07-29 DIAGNOSIS — B20 HIV DISEASE: Primary | ICD-10-CM

## 2024-07-29 DIAGNOSIS — E27.8 ADRENAL MASS 1 CM TO 4 CM IN DIAMETER WITH NO HISTORY OF MALIGNANT NEOPLASM: ICD-10-CM

## 2024-07-29 DIAGNOSIS — B20 HIV DISEASE: ICD-10-CM

## 2024-07-29 DIAGNOSIS — E78.00 PURE HYPERCHOLESTEROLEMIA: ICD-10-CM

## 2024-07-29 DIAGNOSIS — I10 PRIMARY HYPERTENSION: ICD-10-CM

## 2024-07-29 DIAGNOSIS — Z23 NEED FOR VACCINATION: ICD-10-CM

## 2024-07-29 LAB
25(OH)D3+25(OH)D2 SERPL-MCNC: 29 NG/ML (ref 30–80)
ALBUMIN SERPL-MCNC: 4 G/DL (ref 3.4–4.8)
ALBUMIN/GLOB SERPL: 1.1 RATIO (ref 1.1–2)
ALP SERPL-CCNC: 119 UNIT/L (ref 40–150)
ALT SERPL-CCNC: 21 UNIT/L (ref 0–55)
ANION GAP SERPL CALC-SCNC: 5 MEQ/L
AST SERPL-CCNC: 23 UNIT/L (ref 5–34)
BASOPHILS # BLD AUTO: 0.03 X10(3)/MCL
BASOPHILS NFR BLD AUTO: 0.4 %
BILIRUB SERPL-MCNC: 0.3 MG/DL
BUN SERPL-MCNC: 17.5 MG/DL (ref 8.4–25.7)
CALCIUM SERPL-MCNC: 9.7 MG/DL (ref 8.8–10)
CHLORIDE SERPL-SCNC: 108 MMOL/L (ref 98–107)
CO2 SERPL-SCNC: 26 MMOL/L (ref 23–31)
CREAT SERPL-MCNC: 1.25 MG/DL (ref 0.73–1.18)
CREAT/UREA NIT SERPL: 14
EOSINOPHIL # BLD AUTO: 0.25 X10(3)/MCL (ref 0–0.9)
EOSINOPHIL NFR BLD AUTO: 3.3 %
ERYTHROCYTE [DISTWIDTH] IN BLOOD BY AUTOMATED COUNT: 13.2 % (ref 11.5–17)
GFR SERPLBLD CREATININE-BSD FMLA CKD-EPI: >60 ML/MIN/1.73/M2
GLOBULIN SER-MCNC: 3.7 GM/DL (ref 2.4–3.5)
GLUCOSE SERPL-MCNC: 97 MG/DL (ref 82–115)
HCT VFR BLD AUTO: 40.3 % (ref 42–52)
HGB BLD-MCNC: 13.9 G/DL (ref 14–18)
IMM GRANULOCYTES # BLD AUTO: 0.01 X10(3)/MCL (ref 0–0.04)
IMM GRANULOCYTES NFR BLD AUTO: 0.1 %
LYMPHOCYTES # BLD AUTO: 3.31 X10(3)/MCL (ref 0.6–4.6)
LYMPHOCYTES NFR BLD AUTO: 43.2 %
MCH RBC QN AUTO: 32.3 PG (ref 27–31)
MCHC RBC AUTO-ENTMCNC: 34.5 G/DL (ref 33–36)
MCV RBC AUTO: 93.7 FL (ref 80–94)
MONOCYTES # BLD AUTO: 0.81 X10(3)/MCL (ref 0.1–1.3)
MONOCYTES NFR BLD AUTO: 10.6 %
NEUTROPHILS # BLD AUTO: 3.25 X10(3)/MCL (ref 2.1–9.2)
NEUTROPHILS NFR BLD AUTO: 42.4 %
NRBC BLD AUTO-RTO: 0 %
PLATELET # BLD AUTO: 151 X10(3)/MCL (ref 130–400)
PMV BLD AUTO: 10.7 FL (ref 7.4–10.4)
POTASSIUM SERPL-SCNC: 3.7 MMOL/L (ref 3.5–5.1)
PROT SERPL-MCNC: 7.7 GM/DL (ref 5.8–7.6)
RBC # BLD AUTO: 4.3 X10(6)/MCL (ref 4.7–6.1)
SODIUM SERPL-SCNC: 139 MMOL/L (ref 136–145)
WBC # BLD AUTO: 7.66 X10(3)/MCL (ref 4.5–11.5)

## 2024-07-29 PROCEDURE — 85025 COMPLETE CBC W/AUTO DIFF WBC: CPT

## 2024-07-29 PROCEDURE — 80053 COMPREHEN METABOLIC PANEL: CPT

## 2024-07-29 PROCEDURE — 1160F RVW MEDS BY RX/DR IN RCRD: CPT | Mod: CPTII,,, | Performed by: NURSE PRACTITIONER

## 2024-07-29 PROCEDURE — 36415 COLL VENOUS BLD VENIPUNCTURE: CPT

## 2024-07-29 PROCEDURE — 99214 OFFICE O/P EST MOD 30 MIN: CPT | Mod: PBBFAC,25 | Performed by: NURSE PRACTITIONER

## 2024-07-29 PROCEDURE — 3044F HG A1C LEVEL LT 7.0%: CPT | Mod: CPTII,,, | Performed by: NURSE PRACTITIONER

## 2024-07-29 PROCEDURE — 3008F BODY MASS INDEX DOCD: CPT | Mod: CPTII,,, | Performed by: NURSE PRACTITIONER

## 2024-07-29 PROCEDURE — 99214 OFFICE O/P EST MOD 30 MIN: CPT | Mod: S$PBB,,, | Performed by: NURSE PRACTITIONER

## 2024-07-29 PROCEDURE — 3078F DIAST BP <80 MM HG: CPT | Mod: CPTII,,, | Performed by: NURSE PRACTITIONER

## 2024-07-29 PROCEDURE — 4010F ACE/ARB THERAPY RXD/TAKEN: CPT | Mod: CPTII,,, | Performed by: NURSE PRACTITIONER

## 2024-07-29 PROCEDURE — 87536 HIV-1 QUANT&REVRSE TRNSCRPJ: CPT

## 2024-07-29 PROCEDURE — 3074F SYST BP LT 130 MM HG: CPT | Mod: CPTII,,, | Performed by: NURSE PRACTITIONER

## 2024-07-29 PROCEDURE — 82306 VITAMIN D 25 HYDROXY: CPT

## 2024-07-29 PROCEDURE — 90471 IMMUNIZATION ADMIN: CPT | Mod: PBBFAC

## 2024-07-29 PROCEDURE — 90739 HEPB VACC 2/4 DOSE ADULT IM: CPT | Mod: PBBFAC

## 2024-07-29 PROCEDURE — 1159F MED LIST DOCD IN RCRD: CPT | Mod: CPTII,,, | Performed by: NURSE PRACTITIONER

## 2024-07-29 RX ORDER — ABACAVIR SULFATE, DOLUTEGRAVIR SODIUM, LAMIVUDINE 600; 50; 300 MG/1; MG/1; MG/1
1 TABLET, FILM COATED ORAL DAILY
Qty: 90 TABLET | Refills: 1 | Status: SHIPPED | OUTPATIENT
Start: 2024-07-29

## 2024-07-29 RX ADMIN — HEPATITIS B VACCINE (RECOMBINANT) ADJUVANTED 0.5 ML: 20 INJECTION, SOLUTION INTRAMUSCULAR at 08:07

## 2024-07-29 NOTE — PROGRESS NOTES
Heplisav-B Vaccination given IM right deltoid using aseptic tech. Patient tolerated well. To contact clinic prn.

## 2024-07-29 NOTE — PROGRESS NOTES
Patient ID: Curry Bello 58 y.o.     Chief Complaint:   Chief Complaint   Patient presents with    Followup HIV     Denies problems        HPI:    7/29/24  Mr. Zapien is a 59 yo AAM here today for HIV f/u visit. He is virally suppressed on Triumeq and tolerates it well. Labs 4/24 VL UD, Creatinine 1.28, eGFR >60, 3/24 CD4 1100.  Hep B s ab +, titer 22.  Amenable to Hep B vaccine booster dose today. Scheduled for colonoscopy 8/9/24 with plans to attend. BP at goal. Cholesterol well controlled. He does not currently have a PCP and is open to referral to [facility] for new PCP. Order placed. Has not heard from endocrine office for visit. Will plan to repeat CT without contrast 11/2024 as radiologist recommended. Last eye exam 2022 at [facility], will schedule a f/u exam. All questions answered & concerns addressed.    4/29/24  Mr. Zapien is a 59 yo AAM presenting today for f/u visit. He is taking Triumeq daily without interruption. Discussed inconclusive reports of abacavir associations with increase for cardiac associated risks. Desires to continue with Triumeq daily. Labs 3/5/24 , CD4 1100.  He is immune to Hep A. Will add Hep B s ab to labs today and repeat VL as well. He tells me that he missed MRI of abd with adrenal protocol as ordered under recommendation of radiologist for further evaluation of adrenal lesion. Will assist with rescheduling, as he states that he called to r/s but did not receive a return call. He is scheduled for colonoscopy with Dr. Magana as referred last visit. BP at goal. Now taking all medications as prescribed. All questions answered & concerns addressed.     3/21/24  Mr. Zapien is a 59 yo AAM here today for HIV f/u visit.  He has been taking Triumeq daily since last visit & is tolerating it well. Labs 3/5/24 , CD4 1100. BP is improved, not quite at goal yet. He has not yet received call to schedule visit with cardiology, will assist with same.  CT chest/abd/pelvis results  reviewed in depth with pt. Will plan for annual LDCT lung cancer screening.  Hx of smoking 1 ppd of cigarettes x 25 years, quit 2015.Acknowledges that he had not been taking atorvastatin consistently over past year, but has recently resumed. Will continue to follow.  He had noted acute umbilical pain with certain straining/lifting activity a couple of years ago, but has not bothered him since. Will continue to monitor for periumbilical hernia.  Order has already been placed for adrenal CT with contrast, but has not been scheduled as of yet. Will assist with same today. He also has not had XR of hip done yet, will come in tomorrow for same. He did not reach out to Dr. Magana's office for repeat colonoscopy, will place referral order as requested. All questions answered & concerns addressed.              Past Medical History:   Diagnosis Date    Depression     GERD (gastroesophageal reflux disease)     HIV infection     Hyperlipidemia     Personal history of colonic polyps 11/28/2016    [facility]- Dr. Oliverio Magana        Past Surgical History:   Procedure Laterality Date    COLONOSCOPY W/ POLYPECTOMY  11/28/2016    [facility]- Dr. Oliverio Magana        Social History     Socioeconomic History    Marital status:    Tobacco Use    Smoking status: Never    Smokeless tobacco: Never   Substance and Sexual Activity    Alcohol use: Never    Drug use: Never    Sexual activity: Not Currently     Birth control/protection: Condom        No family history on file.     Review of patient's allergies indicates:   Allergen Reactions    Citrus and derivatives         Immunization History   Administered Date(s) Administered    COVID-19, vector-nr, rS-Ad26, PF (Steve) 04/09/2021    Hepatitis A / Hepatitis B 09/03/2015, 10/01/2015, 03/14/2016    Hepatitis B (recombinant) Adjuvanted, 2 dose 07/29/2024    Influenza - Quadrivalent 11/09/2016    Influenza - Quadrivalent - PF  *Preferred* (6 months and older) 10/01/2022, 10/26/2023    Meningococcal Conjugate (MCV4P) 07/02/2018, 05/31/2019    Pneumococcal Conjugate - 13 Valent 09/03/2015    Pneumococcal Polysaccharide - 23 Valent 11/12/2015, 03/22/2021    Tdap 10/01/2015    Zoster Recombinant 08/30/2022, 02/17/2023        Review of Systems   Constitutional: Negative.    HENT: Negative.     Eyes: Negative.    Respiratory: Negative.     Cardiovascular: Negative.    Gastrointestinal: Negative.    Genitourinary: Negative.    Musculoskeletal: Negative.    Skin: Negative.    Neurological: Negative.    Endo/Heme/Allergies: Negative.    Psychiatric/Behavioral: Negative.     All other systems reviewed and are negative.         Objective:      /77 (BP Location: Right arm, Patient Position: Sitting, BP Method: Large (Automatic))   Pulse 75   Temp 98.2 °F (36.8 °C) (Oral)   Resp 12   Ht 6' (1.829 m)   Wt 103.5 kg (228 lb 2.8 oz)   BMI 30.95 kg/m²      Physical Exam  Vitals reviewed.   Constitutional:       General: He is not in acute distress.     Appearance: Normal appearance. He is not toxic-appearing.   HENT:      Mouth/Throat:      Mouth: Mucous membranes are moist.      Pharynx: Oropharynx is clear.   Eyes:      Conjunctiva/sclera: Conjunctivae normal.   Cardiovascular:      Rate and Rhythm: Normal rate and regular rhythm.   Pulmonary:      Effort: Pulmonary effort is normal. No respiratory distress.      Breath sounds: Normal breath sounds.   Abdominal:      General: Abdomen is flat. Bowel sounds are normal.      Palpations: Abdomen is soft.   Musculoskeletal:         General: Normal range of motion.      Cervical back: Normal range of motion.   Lymphadenopathy:      Cervical: No cervical adenopathy.   Skin:     General: Skin is warm and dry.   Neurological:      General: No focal deficit present.      Mental Status: He is alert and oriented to person, place, and time. Mental status is at baseline.   Psychiatric:         Mood and  Affect: Mood normal.         Behavior: Behavior normal.          Labs:   Lab Results   Component Value Date    WBC 7.31 03/05/2024    HGB 15.2 03/05/2024    HCT 45.1 03/05/2024    MCV 89.7 03/05/2024     03/05/2024       CMP  Sodium   Date Value Ref Range Status   04/29/2024 137 136 - 145 mmol/L Final     Potassium   Date Value Ref Range Status   04/29/2024 3.8 3.5 - 5.1 mmol/L Final     Chloride   Date Value Ref Range Status   04/29/2024 103 98 - 107 mmol/L Final     CO2   Date Value Ref Range Status   04/29/2024 26 23 - 31 mmol/L Final     Blood Urea Nitrogen   Date Value Ref Range Status   04/29/2024 14.7 8.4 - 25.7 mg/dL Final     Creatinine   Date Value Ref Range Status   04/29/2024 1.28 (H) 0.73 - 1.18 mg/dL Final     Calcium   Date Value Ref Range Status   04/29/2024 9.9 8.8 - 10.0 mg/dL Final     Albumin   Date Value Ref Range Status   03/05/2024 3.8 3.4 - 4.8 g/dL Final     Bilirubin Total   Date Value Ref Range Status   03/05/2024 0.3 <=1.5 mg/dL Final     ALP   Date Value Ref Range Status   03/05/2024 108 40 - 150 unit/L Final     AST   Date Value Ref Range Status   03/05/2024 28 5 - 34 unit/L Final     ALT   Date Value Ref Range Status   03/05/2024 21 0 - 55 unit/L Final     eGFR   Date Value Ref Range Status   04/29/2024 >60 mls/min/1.73/m2 Final     Lab Results   Component Value Date    TSH 1.987 03/05/2024     Hep C Ab Interp   Date Value Ref Range Status   03/05/2024 Nonreactive Nonreactive Final     Syphilis Antibody   Date Value Ref Range Status   03/05/2024 Nonreactive Nonreactive, Equivocal Final     Cholesterol Total   Date Value Ref Range Status   03/05/2024 196 <=200 mg/dL Final     HDL Cholesterol   Date Value Ref Range Status   03/05/2024 41 35 - 60 mg/dL Final     Triglyceride   Date Value Ref Range Status   03/05/2024 117 34 - 140 mg/dL Final     Cholesterol/HDL Ratio   Date Value Ref Range Status   03/05/2024 5 0 - 5 Final     Very Low Density Lipoprotein   Date Value Ref Range  Status   03/05/2024 23  Final     LDL Cholesterol   Date Value Ref Range Status   03/05/2024 132.00 50.00 - 140.00 mg/dL Final     Vitamin D   Date Value Ref Range Status   03/05/2024 13.5 (L) 30.0 - 80.0 ng/mL Final     Results for orders placed or performed in visit on 07/14/22   CD4 Lymphocytes   Result Value Ref Range    Patient Age 56     WBC Absolute 6,900 4,500 - 11,500 /mm3    Lymph Percent 44.4 28 - 48 %    Lymph Absolute 3,063.6 1,260 - 5,520 x10(3)/mcL    CD4 % 53.0 %    CD4 Absolute 1,623.708 (H) 589 - 1,505 unit/L    T Cell Interp       Normal total lymphocyte absolute count and normal percentage.  Increased CD4+ T helper cell absolute count and normal percentage.    Caryn Howe MD       Results for orders placed or performed in visit on 04/29/24   HIV-1 RNA, Quantitative, PCR with Reflex to Genotype   Result Value Ref Range    HIV-1 RNA Detect/Quant, P Undetected Undetected copies/mL     Results for orders placed or performed in visit on 03/05/24   Quantiferon Gold TB   Result Value Ref Range    QuantiFERON-Tb Gold Plus Result Negative Negative    TB1 Ag minus Nil Result 0.01 IU/mL    TB2 Ag minus Nil Result 0.01 IU/mL    Mitogen minus Nil Result >10.00 IU/mL    Nil Result 0.02 IU/mL     No results found for this or any previous visit.  Results for orders placed or performed in visit on 03/05/24   Urinalysis   Result Value Ref Range    Color, UA Yellow Yellow, Light-Yellow, Dark Yellow, Theresa, Straw    Appearance, UA Clear Clear    Specific Gravity, UA 1.029 1.005 - 1.030    pH, UA 5.5 5.0 - 8.5    Protein, UA Trace (A) Negative    Glucose, UA Normal Negative, Normal    Ketones, UA Negative Negative    Blood, UA Negative Negative    Bilirubin, UA Negative Negative    Urobilinogen, UA 1+ (A) 0.2, 1.0, Normal    Nitrites, UA Negative Negative    Leukocyte Esterase, UA 75 (A) Negative    WBC, UA 11-20 (A) None Seen, 0-2, 3-5, 0-5 /HPF    Bacteria, UA None Seen None Seen /HPF    Squamous Epithelial  Cells, UA Trace (A) None Seen /HPF    Mucous, UA Trace (A) None Seen /LPF    Hyaline Casts, UA None Seen None Seen /lpf    RBC, UA 0-5 None Seen, 0-2, 3-5, 0-5 /HPF       Imaging: Reviewed most recent relevant imaging studies available, notable results highlighted in this note    Medications:     Current Outpatient Medications   Medication Instructions    amLODIPine (NORVASC) 10 mg, Oral, Daily    atorvastatin (LIPITOR) 10 mg, Oral, Nightly    ergocalciferol (ERGOCALCIFEROL) 50,000 Units, Oral, Every 7 days    omeprazole (PRILOSEC) 20 MG capsule TAKE ONE CAPSULE BY MOUTH DAILY    sertraline (ZOLOFT) 150 mg, Oral, Daily    TRIUMEQ 600- mg Tab 1 tablet, Oral, Daily    valsartan (DIOVAN) 40 MG tablet 0       Assessment:       Problem List Items Addressed This Visit    None  Visit Diagnoses       HIV disease    -  Primary    Relevant Medications    TRIUMEQ 600- mg Tab    Other Relevant Orders    HIV-1 RNA, Quantitative, PCR with Reflex to Genotype    CBC Auto Differential    Comprehensive Metabolic Panel    Need for vaccination        Relevant Medications    hepatitis B (HEPLISAV-B) 20 mcg/0.5 mL vaccine 0.5 mL (Completed) (Start on 7/29/2024  9:43 AM)    Vitamin D deficiency        Relevant Orders    Vitamin D    Ambulatory referral/consult to Internal Medicine    Primary hypertension        Relevant Orders    Ambulatory referral/consult to Internal Medicine    Pure hypercholesterolemia        Relevant Orders    Ambulatory referral/consult to Internal Medicine    Adrenal mass 1 cm to 4 cm in diameter with no history of malignant neoplasm                   Plan:      HIV disease  -     HIV-1 RNA, Quantitative, PCR with Reflex to Genotype; Future; Expected date: 07/29/2024  -     CBC Auto Differential; Future; Expected date: 07/29/2024  -     Comprehensive Metabolic Panel; Future; Expected date: 07/29/2024  -     TRIUMEQ 600- mg Tab; Take 1 tablet by mouth once daily.  Dispense: 90 tablet; Refill:  1  Adherence and sexual health counseling done.  Use condoms for all sexual encounters.  Blood precautions.   Continue Triumeq 1 po daily as prescribed.  RTC 3 months with Katelyn.     HIV Wellness:  Anal pap: 3/21 QNS, declines 8/30/22  Oral CT/GC: 5/19 Neg  Anal CT/GC: 5/19 Neg  Urine CT/GC: 3/24 Neg  RPR: 3/24 NR  Ophth: 9/22 [facility]  DEXA: 2/16 -1.1, 9/22 -1.4   Colon Cancer Screen: 11/2016 Dr. Magana c-scope with polypectomy; mother with history of colon cancer. Repeat scope scheduled 8/9/24.    Need for vaccination  -     hepatitis B (HEPLISAV-B) 20 mcg/0.5 mL vaccine 0.5 mL  Heplisav-B booster dose today.    Vitamin D deficiency  -     Vitamin D; Future; Expected date: 07/29/2024  -     Ambulatory referral/consult to Internal Medicine; Future; Expected date: 08/12/2024  Continue Ergocalciferol 86644 weekly as prescribed.  Repeat level today.    Primary hypertension  -     Ambulatory referral/consult to Internal Medicine; Future; Expected date: 08/12/2024  At goal.  Continue Amlodipine 10 mg daily.  Medication compliance encouraged.  BP goal <130/80. Monitor BP at home & keep log of readings.  Low sodium diet, max 2 grams per day.  Weight control recommended.  Avoid illicit drug use and excessive alcohol intake.  Increase exercise activity, goal 30 minutes moderate exertion 3-5 times per week.  Stress reduction strategies such as meditation, deep breathing, stretching, prayer, social support system.      Pure hypercholesterolemia  -     Ambulatory referral/consult to Internal Medicine; Future; Expected date: 08/12/2024  Continue atorvastatin 10 mg daily.   Will repeat lipids in 6 months.   Decrease intake of fried & greasy foods.    Increase intake of Omega 3 rich foods in diet such as fatty fish, nuts, avocado, etc.  Avoid trans fat in diet, commonly found in packaged foods such as snacks/cakes/cookies.  Increase fiber intake.   Increase exercise to at least 30 minutes of moderate activity 3-5 days  per week.     Adrenal mass 1 cm to 4 cm in diameter with no history of malignant neoplasm  Repeat non-contrast CT 11/2024 as recommended by radiologist.

## 2024-07-30 LAB — HIV1 RNA # PLAS NAA DL=20: NORMAL COPIES/ML

## 2024-10-07 DIAGNOSIS — R10.9 UNSPECIFIED ABDOMINAL PAIN: ICD-10-CM

## 2024-10-07 RX ORDER — OMEPRAZOLE 20 MG/1
CAPSULE, DELAYED RELEASE ORAL
Qty: 90 CAPSULE | Refills: 1 | Status: SHIPPED | OUTPATIENT
Start: 2024-10-07

## 2024-10-29 ENCOUNTER — LAB VISIT (OUTPATIENT)
Dept: LAB | Facility: HOSPITAL | Age: 59
End: 2024-10-29
Attending: NURSE PRACTITIONER
Payer: COMMERCIAL

## 2024-10-29 ENCOUNTER — OFFICE VISIT (OUTPATIENT)
Dept: INFECTIOUS DISEASES | Facility: CLINIC | Age: 59
End: 2024-10-29
Payer: COMMERCIAL

## 2024-10-29 VITALS
DIASTOLIC BLOOD PRESSURE: 70 MMHG | RESPIRATION RATE: 12 BRPM | BODY MASS INDEX: 31.56 KG/M2 | SYSTOLIC BLOOD PRESSURE: 135 MMHG | WEIGHT: 233 LBS | HEART RATE: 70 BPM | HEIGHT: 72 IN | TEMPERATURE: 99 F

## 2024-10-29 DIAGNOSIS — E27.8 ADRENAL MASS 1 CM TO 4 CM IN DIAMETER WITH NO HISTORY OF MALIGNANT NEOPLASM: ICD-10-CM

## 2024-10-29 DIAGNOSIS — E27.8 OTHER SPECIFIED DISORDERS OF ADRENAL GLAND: ICD-10-CM

## 2024-10-29 DIAGNOSIS — B20 HIV DISEASE: ICD-10-CM

## 2024-10-29 DIAGNOSIS — B20 HIV DISEASE: Primary | ICD-10-CM

## 2024-10-29 DIAGNOSIS — Z12.11 COLON CANCER SCREENING: ICD-10-CM

## 2024-10-29 LAB
ALBUMIN SERPL-MCNC: 3.8 G/DL (ref 3.4–4.8)
ALBUMIN/GLOB SERPL: 1.1 RATIO (ref 1.1–2)
ALP SERPL-CCNC: 118 UNIT/L (ref 40–150)
ALT SERPL-CCNC: 21 UNIT/L (ref 0–55)
ANION GAP SERPL CALC-SCNC: 5 MEQ/L
AST SERPL-CCNC: 23 UNIT/L (ref 5–34)
BASOPHILS # BLD AUTO: 0.04 X10(3)/MCL
BASOPHILS NFR BLD AUTO: 0.5 %
BILIRUB SERPL-MCNC: 0.3 MG/DL
BUN SERPL-MCNC: 14.9 MG/DL (ref 8.4–25.7)
CALCIUM SERPL-MCNC: 9.4 MG/DL (ref 8.8–10)
CHLORIDE SERPL-SCNC: 107 MMOL/L (ref 98–107)
CO2 SERPL-SCNC: 26 MMOL/L (ref 23–31)
CREAT SERPL-MCNC: 1.39 MG/DL (ref 0.72–1.25)
CREAT/UREA NIT SERPL: 11
EOSINOPHIL # BLD AUTO: 0.2 X10(3)/MCL (ref 0–0.9)
EOSINOPHIL NFR BLD AUTO: 2.5 %
ERYTHROCYTE [DISTWIDTH] IN BLOOD BY AUTOMATED COUNT: 12.7 % (ref 11.5–17)
GFR SERPLBLD CREATININE-BSD FMLA CKD-EPI: 58 ML/MIN/1.73/M2
GLOBULIN SER-MCNC: 3.5 GM/DL (ref 2.4–3.5)
GLUCOSE SERPL-MCNC: 111 MG/DL (ref 82–115)
HBV SURFACE AB SER-ACNC: 126.68 MIU/ML
HBV SURFACE AB SERPL IA-ACNC: REACTIVE M[IU]/ML
HCT VFR BLD AUTO: 41.1 % (ref 42–52)
HGB BLD-MCNC: 14.2 G/DL (ref 14–18)
IMM GRANULOCYTES # BLD AUTO: 0.01 X10(3)/MCL (ref 0–0.04)
IMM GRANULOCYTES NFR BLD AUTO: 0.1 %
LYMPHOCYTES # BLD AUTO: 2.67 X10(3)/MCL (ref 0.6–4.6)
LYMPHOCYTES NFR BLD AUTO: 33.9 %
MCH RBC QN AUTO: 32.1 PG (ref 27–31)
MCHC RBC AUTO-ENTMCNC: 34.5 G/DL (ref 33–36)
MCV RBC AUTO: 93 FL (ref 80–94)
MONOCYTES # BLD AUTO: 0.76 X10(3)/MCL (ref 0.1–1.3)
MONOCYTES NFR BLD AUTO: 9.7 %
NEUTROPHILS # BLD AUTO: 4.19 X10(3)/MCL (ref 2.1–9.2)
NEUTROPHILS NFR BLD AUTO: 53.3 %
NRBC BLD AUTO-RTO: 0 %
PLATELET # BLD AUTO: 160 X10(3)/MCL (ref 130–400)
PMV BLD AUTO: 10.5 FL (ref 7.4–10.4)
POTASSIUM SERPL-SCNC: 4.1 MMOL/L (ref 3.5–5.1)
PROT SERPL-MCNC: 7.3 GM/DL (ref 5.8–7.6)
RBC # BLD AUTO: 4.42 X10(6)/MCL (ref 4.7–6.1)
SODIUM SERPL-SCNC: 138 MMOL/L (ref 136–145)
WBC # BLD AUTO: 7.87 X10(3)/MCL (ref 4.5–11.5)

## 2024-10-29 PROCEDURE — 36415 COLL VENOUS BLD VENIPUNCTURE: CPT

## 2024-10-29 PROCEDURE — 80053 COMPREHEN METABOLIC PANEL: CPT

## 2024-10-29 PROCEDURE — 99215 OFFICE O/P EST HI 40 MIN: CPT | Mod: PBBFAC | Performed by: NURSE PRACTITIONER

## 2024-10-29 PROCEDURE — 99214 OFFICE O/P EST MOD 30 MIN: CPT | Mod: S$PBB,,, | Performed by: NURSE PRACTITIONER

## 2024-10-29 PROCEDURE — 3078F DIAST BP <80 MM HG: CPT | Mod: CPTII,,, | Performed by: NURSE PRACTITIONER

## 2024-10-29 PROCEDURE — 86706 HEP B SURFACE ANTIBODY: CPT

## 2024-10-29 PROCEDURE — 1160F RVW MEDS BY RX/DR IN RCRD: CPT | Mod: CPTII,,, | Performed by: NURSE PRACTITIONER

## 2024-10-29 PROCEDURE — 86360 T CELL ABSOLUTE COUNT/RATIO: CPT

## 2024-10-29 PROCEDURE — 3008F BODY MASS INDEX DOCD: CPT | Mod: CPTII,,, | Performed by: NURSE PRACTITIONER

## 2024-10-29 PROCEDURE — 87536 HIV-1 QUANT&REVRSE TRNSCRPJ: CPT

## 2024-10-29 PROCEDURE — 1159F MED LIST DOCD IN RCRD: CPT | Mod: CPTII,,, | Performed by: NURSE PRACTITIONER

## 2024-10-29 PROCEDURE — 85025 COMPLETE CBC W/AUTO DIFF WBC: CPT

## 2024-10-29 PROCEDURE — 3044F HG A1C LEVEL LT 7.0%: CPT | Mod: CPTII,,, | Performed by: NURSE PRACTITIONER

## 2024-10-29 PROCEDURE — 3075F SYST BP GE 130 - 139MM HG: CPT | Mod: CPTII,,, | Performed by: NURSE PRACTITIONER

## 2024-10-30 LAB
CD3 CELLS # BLD: 2203 CELLS/MCL (ref 550–2202)
CD3 CELLS NFR BLD: 80 % (ref 58–86)
CD3+CD4+ CELLS # BLD: 1310 CELLS/MCL (ref 365–1437)
CD3+CD4+ CELLS NFR BLD: 48 % (ref 32–64)
CD3+CD4+ CELLS/CD3+CD8+ CLL BLD: 1.5 %
CD3+CD8+ CELLS # BLD: 850 CELLS/MCL (ref 80–846)
CD3+CD8+ CELLS NFR BLD: 31 % (ref 8–40)
CD45 CELLS # BLD: 2.75 THOU/MCL (ref 0.82–2.84)
HIV1 RNA # PLAS NAA DL=20: NORMAL COPIES/ML

## 2024-10-31 ENCOUNTER — TELEPHONE (OUTPATIENT)
Dept: INFECTIOUS DISEASES | Facility: CLINIC | Age: 59
End: 2024-10-31
Payer: COMMERCIAL

## 2024-11-05 DIAGNOSIS — E55.9 VITAMIN D DEFICIENCY: ICD-10-CM

## 2024-11-05 RX ORDER — ERGOCALCIFEROL 1.25 MG/1
50000 CAPSULE ORAL
Qty: 12 CAPSULE | Refills: 3 | Status: SHIPPED | OUTPATIENT
Start: 2024-11-05

## 2024-12-03 ENCOUNTER — PATIENT MESSAGE (OUTPATIENT)
Dept: ADMINISTRATIVE | Facility: OTHER | Age: 59
End: 2024-12-03
Payer: COMMERCIAL

## 2024-12-04 ENCOUNTER — PATIENT MESSAGE (OUTPATIENT)
Dept: ADMINISTRATIVE | Facility: OTHER | Age: 59
End: 2024-12-04
Payer: COMMERCIAL

## 2025-01-13 DIAGNOSIS — I10 PRIMARY HYPERTENSION: ICD-10-CM

## 2025-01-13 DIAGNOSIS — E78.5 HYPERLIPIDEMIA, UNSPECIFIED HYPERLIPIDEMIA TYPE: ICD-10-CM

## 2025-01-13 RX ORDER — ATORVASTATIN CALCIUM 10 MG/1
10 TABLET, FILM COATED ORAL NIGHTLY
Qty: 90 TABLET | Refills: 3 | Status: SHIPPED | OUTPATIENT
Start: 2025-01-13

## 2025-01-13 RX ORDER — AMLODIPINE BESYLATE 10 MG/1
10 TABLET ORAL
Qty: 90 TABLET | Refills: 3 | Status: SHIPPED | OUTPATIENT
Start: 2025-01-13

## 2025-01-23 DIAGNOSIS — F32.9 MAJOR DEPRESSIVE DISORDER, SINGLE EPISODE, UNSPECIFIED: ICD-10-CM

## 2025-01-23 DIAGNOSIS — B20 HIV DISEASE: ICD-10-CM

## 2025-01-24 RX ORDER — SERTRALINE HYDROCHLORIDE 100 MG/1
150 TABLET, FILM COATED ORAL
Qty: 45 TABLET | Refills: 0 | Status: SHIPPED | OUTPATIENT
Start: 2025-01-24

## 2025-01-24 RX ORDER — ABACAVIR SULFATE, DOLUTEGRAVIR SODIUM, LAMIVUDINE 600; 50; 300 MG/1; MG/1; MG/1
1 TABLET, FILM COATED ORAL
Qty: 30 TABLET | Refills: 0 | Status: SHIPPED | OUTPATIENT
Start: 2025-01-24

## 2025-03-13 DIAGNOSIS — F32.9 MAJOR DEPRESSIVE DISORDER, SINGLE EPISODE, UNSPECIFIED: ICD-10-CM

## 2025-03-13 DIAGNOSIS — B20 HIV DISEASE: ICD-10-CM

## 2025-03-13 DIAGNOSIS — R10.9 UNSPECIFIED ABDOMINAL PAIN: ICD-10-CM

## 2025-03-13 RX ORDER — ABACAVIR SULFATE, DOLUTEGRAVIR SODIUM, LAMIVUDINE 600; 50; 300 MG/1; MG/1; MG/1
1 TABLET, FILM COATED ORAL
Qty: 30 TABLET | Refills: 0 | Status: SHIPPED | OUTPATIENT
Start: 2025-03-13

## 2025-03-13 RX ORDER — SERTRALINE HYDROCHLORIDE 100 MG/1
TABLET, FILM COATED ORAL
Qty: 45 TABLET | Refills: 0 | Status: SHIPPED | OUTPATIENT
Start: 2025-03-13

## 2025-03-13 RX ORDER — OMEPRAZOLE 20 MG/1
20 CAPSULE, DELAYED RELEASE ORAL
Qty: 90 CAPSULE | Refills: 3 | Status: SHIPPED | OUTPATIENT
Start: 2025-03-13

## 2025-03-18 ENCOUNTER — LAB VISIT (OUTPATIENT)
Dept: LAB | Facility: HOSPITAL | Age: 60
End: 2025-03-18
Attending: NURSE PRACTITIONER
Payer: COMMERCIAL

## 2025-03-18 ENCOUNTER — OFFICE VISIT (OUTPATIENT)
Dept: INFECTIOUS DISEASES | Facility: CLINIC | Age: 60
End: 2025-03-18
Payer: COMMERCIAL

## 2025-03-18 VITALS
RESPIRATION RATE: 12 BRPM | WEIGHT: 233.69 LBS | HEIGHT: 72 IN | DIASTOLIC BLOOD PRESSURE: 76 MMHG | BODY MASS INDEX: 31.65 KG/M2 | SYSTOLIC BLOOD PRESSURE: 133 MMHG | TEMPERATURE: 98 F | HEART RATE: 76 BPM

## 2025-03-18 DIAGNOSIS — Z12.11 COLON CANCER SCREENING: ICD-10-CM

## 2025-03-18 DIAGNOSIS — I10 PRIMARY HYPERTENSION: ICD-10-CM

## 2025-03-18 DIAGNOSIS — E78.5 HYPERLIPIDEMIA, UNSPECIFIED HYPERLIPIDEMIA TYPE: ICD-10-CM

## 2025-03-18 DIAGNOSIS — F32.9 MAJOR DEPRESSIVE DISORDER, SINGLE EPISODE, UNSPECIFIED: ICD-10-CM

## 2025-03-18 DIAGNOSIS — E55.9 VITAMIN D DEFICIENCY: ICD-10-CM

## 2025-03-18 DIAGNOSIS — E27.8 ADRENAL MASS 1 CM TO 4 CM IN DIAMETER WITH NO HISTORY OF MALIGNANT NEOPLASM: ICD-10-CM

## 2025-03-18 DIAGNOSIS — Z21 ASYMPTOMATIC HIV INFECTION, WITH NO HISTORY OF HIV-RELATED ILLNESS: Primary | ICD-10-CM

## 2025-03-18 DIAGNOSIS — I49.9 IRREGULAR HEART RATE: ICD-10-CM

## 2025-03-18 DIAGNOSIS — Z21 ASYMPTOMATIC HIV INFECTION, WITH NO HISTORY OF HIV-RELATED ILLNESS: ICD-10-CM

## 2025-03-18 LAB
25(OH)D3+25(OH)D2 SERPL-MCNC: 31 NG/ML (ref 30–80)
ALBUMIN SERPL-MCNC: 3.8 G/DL (ref 3.4–4.8)
ALBUMIN/GLOB SERPL: 0.9 RATIO (ref 1.1–2)
ALP SERPL-CCNC: 109 UNIT/L (ref 40–150)
ALT SERPL-CCNC: 28 UNIT/L (ref 0–55)
ANION GAP SERPL CALC-SCNC: 9 MEQ/L
AST SERPL-CCNC: 22 UNIT/L (ref 5–34)
BACTERIA #/AREA URNS AUTO: ABNORMAL /HPF
BASOPHILS # BLD AUTO: 0.02 X10(3)/MCL
BASOPHILS NFR BLD AUTO: 0.3 %
BILIRUB SERPL-MCNC: 0.4 MG/DL
BILIRUB UR QL STRIP.AUTO: NEGATIVE
BUN SERPL-MCNC: 9.4 MG/DL (ref 8.4–25.7)
C TRACH DNA SPEC QL NAA+PROBE: NOT DETECTED
CALCIUM SERPL-MCNC: 9.7 MG/DL (ref 8.8–10)
CHLORIDE SERPL-SCNC: 106 MMOL/L (ref 98–107)
CHOLEST SERPL-MCNC: 197 MG/DL
CHOLEST/HDLC SERPL: 5 {RATIO} (ref 0–5)
CLARITY UR: CLEAR
CO2 SERPL-SCNC: 25 MMOL/L (ref 23–31)
COLOR UR AUTO: YELLOW
CREAT SERPL-MCNC: 1.21 MG/DL (ref 0.72–1.25)
CREAT/UREA NIT SERPL: 8
EOSINOPHIL # BLD AUTO: 0.2 X10(3)/MCL (ref 0–0.9)
EOSINOPHIL NFR BLD AUTO: 2.9 %
ERYTHROCYTE [DISTWIDTH] IN BLOOD BY AUTOMATED COUNT: 12.7 % (ref 11.5–17)
EST. AVERAGE GLUCOSE BLD GHB EST-MCNC: 119.8 MG/DL
GFR SERPLBLD CREATININE-BSD FMLA CKD-EPI: >60 ML/MIN/1.73/M2
GLOBULIN SER-MCNC: 4.4 GM/DL (ref 2.4–3.5)
GLUCOSE SERPL-MCNC: 106 MG/DL (ref 82–115)
GLUCOSE UR QL STRIP: NORMAL
HBA1C MFR BLD: 5.8 %
HCT VFR BLD AUTO: 44 % (ref 42–52)
HCV AB SERPL QL IA: NONREACTIVE
HDLC SERPL-MCNC: 41 MG/DL (ref 35–60)
HGB BLD-MCNC: 14.7 G/DL (ref 14–18)
HGB UR QL STRIP: NEGATIVE
HYALINE CASTS #/AREA URNS LPF: ABNORMAL /LPF
IMM GRANULOCYTES # BLD AUTO: 0.01 X10(3)/MCL (ref 0–0.04)
IMM GRANULOCYTES NFR BLD AUTO: 0.1 %
KETONES UR QL STRIP: NEGATIVE
LDLC SERPL CALC-MCNC: 139 MG/DL (ref 50–140)
LEUKOCYTE ESTERASE UR QL STRIP: NEGATIVE
LYMPHOCYTES # BLD AUTO: 2.15 X10(3)/MCL (ref 0.6–4.6)
LYMPHOCYTES NFR BLD AUTO: 31.3 %
MCH RBC QN AUTO: 31.3 PG (ref 27–31)
MCHC RBC AUTO-ENTMCNC: 33.4 G/DL (ref 33–36)
MCV RBC AUTO: 93.8 FL (ref 80–94)
MONOCYTES # BLD AUTO: 0.71 X10(3)/MCL (ref 0.1–1.3)
MONOCYTES NFR BLD AUTO: 10.3 %
MUCOUS THREADS URNS QL MICRO: ABNORMAL /LPF
N GONORRHOEA DNA SPEC QL NAA+PROBE: NOT DETECTED
NEUTROPHILS # BLD AUTO: 3.79 X10(3)/MCL (ref 2.1–9.2)
NEUTROPHILS NFR BLD AUTO: 55.1 %
NITRITE UR QL STRIP: NEGATIVE
NRBC BLD AUTO-RTO: 0 %
PH UR STRIP: 5.5 [PH]
PLATELET # BLD AUTO: 145 X10(3)/MCL (ref 130–400)
PLATELETS.RETICULATED NFR BLD AUTO: 5.3 % (ref 0.9–11.2)
PMV BLD AUTO: 11.2 FL (ref 7.4–10.4)
POTASSIUM SERPL-SCNC: 4.8 MMOL/L (ref 3.5–5.1)
PROT SERPL-MCNC: 8.2 GM/DL (ref 5.8–7.6)
PROT UR QL STRIP: ABNORMAL
RBC # BLD AUTO: 4.69 X10(6)/MCL (ref 4.7–6.1)
RBC #/AREA URNS AUTO: ABNORMAL /HPF
SODIUM SERPL-SCNC: 140 MMOL/L (ref 136–145)
SOURCE (OHS): NORMAL
SP GR UR STRIP.AUTO: 1.03 (ref 1–1.03)
SQUAMOUS #/AREA URNS LPF: ABNORMAL /HPF
T PALLIDUM AB SER QL: NONREACTIVE
TRIGL SERPL-MCNC: 84 MG/DL (ref 34–140)
TSH SERPL-ACNC: 2.09 UIU/ML (ref 0.35–4.94)
UROBILINOGEN UR STRIP-ACNC: NORMAL
VLDLC SERPL CALC-MCNC: 17 MG/DL
WBC # BLD AUTO: 6.88 X10(3)/MCL (ref 4.5–11.5)
WBC #/AREA URNS AUTO: ABNORMAL /HPF

## 2025-03-18 PROCEDURE — 80053 COMPREHEN METABOLIC PANEL: CPT

## 2025-03-18 PROCEDURE — 86780 TREPONEMA PALLIDUM: CPT

## 2025-03-18 PROCEDURE — 87491 CHLMYD TRACH DNA AMP PROBE: CPT | Performed by: NURSE PRACTITIONER

## 2025-03-18 PROCEDURE — 99215 OFFICE O/P EST HI 40 MIN: CPT | Mod: S$PBB,,, | Performed by: NURSE PRACTITIONER

## 2025-03-18 PROCEDURE — 82306 VITAMIN D 25 HYDROXY: CPT

## 2025-03-18 PROCEDURE — 3008F BODY MASS INDEX DOCD: CPT | Mod: CPTII,,, | Performed by: NURSE PRACTITIONER

## 2025-03-18 PROCEDURE — 87536 HIV-1 QUANT&REVRSE TRNSCRPJ: CPT

## 2025-03-18 PROCEDURE — 83036 HEMOGLOBIN GLYCOSYLATED A1C: CPT

## 2025-03-18 PROCEDURE — 86803 HEPATITIS C AB TEST: CPT

## 2025-03-18 PROCEDURE — 86361 T CELL ABSOLUTE COUNT: CPT

## 2025-03-18 PROCEDURE — 81001 URINALYSIS AUTO W/SCOPE: CPT | Performed by: NURSE PRACTITIONER

## 2025-03-18 PROCEDURE — 3078F DIAST BP <80 MM HG: CPT | Mod: CPTII,,, | Performed by: NURSE PRACTITIONER

## 2025-03-18 PROCEDURE — 1160F RVW MEDS BY RX/DR IN RCRD: CPT | Mod: CPTII,,, | Performed by: NURSE PRACTITIONER

## 2025-03-18 PROCEDURE — 85025 COMPLETE CBC W/AUTO DIFF WBC: CPT

## 2025-03-18 PROCEDURE — 84443 ASSAY THYROID STIM HORMONE: CPT

## 2025-03-18 PROCEDURE — 3044F HG A1C LEVEL LT 7.0%: CPT | Mod: CPTII,,, | Performed by: NURSE PRACTITIONER

## 2025-03-18 PROCEDURE — 86480 TB TEST CELL IMMUN MEASURE: CPT

## 2025-03-18 PROCEDURE — 36415 COLL VENOUS BLD VENIPUNCTURE: CPT

## 2025-03-18 PROCEDURE — 1159F MED LIST DOCD IN RCRD: CPT | Mod: CPTII,,, | Performed by: NURSE PRACTITIONER

## 2025-03-18 PROCEDURE — 3075F SYST BP GE 130 - 139MM HG: CPT | Mod: CPTII,,, | Performed by: NURSE PRACTITIONER

## 2025-03-18 PROCEDURE — 80061 LIPID PANEL: CPT

## 2025-03-18 PROCEDURE — 99214 OFFICE O/P EST MOD 30 MIN: CPT | Mod: PBBFAC | Performed by: NURSE PRACTITIONER

## 2025-03-18 RX ORDER — SERTRALINE HYDROCHLORIDE 100 MG/1
100 TABLET, FILM COATED ORAL DAILY
Qty: 90 TABLET | Refills: 3 | Status: SHIPPED | OUTPATIENT
Start: 2025-03-18

## 2025-03-18 NOTE — PROGRESS NOTES
Patient ID: Curry Bello 59 y.o.     Chief Complaint:   Chief Complaint   Patient presents with    Followup HIV     Denies problems        HPI:    3/18/25  Mr. Zapien is a 60 yo AAM here today for HIV f/u visit.  He is taking Triumeq daily with viral suppression. Labs 10/24 VL UD, Cd4 1310, creatinine 1.39, eGFR 58. Historic records reviewed. Diagnosed with HIV 8/2015, baseline VL 6100, CD4 762, Arely none. Discussed possible increased risk for cardiac disease with abacavir based regimens.  He is a good candidate for ART revision to Dovato and is amenable to same. He did have repeat CT of abdomen without contrast as ordered, became confused and thought that it had already been completed. Will reschedule. Missed calls from [facility] for repeat colonoscopy. Will provide him with # to call and schedule appointment. He never did attend cardiology appt as referred 3/2024 for irregular heart rate. Will process new referral for same. Denies chest pain, palpitations, diaphoresis. All questions answered & concerns addressed.    10/29/24  Mr. Zapien is a 60 yo AAM presenting today for HIV f/u visit.  He takes Triumeq daily and is virally suppressed.  Labs 7/29/24 VL UD, Creatinine 1.25, AST 23, ALT 21.  He tells me that he cancelled his scheduled colonoscopy, would prefer to have it performed at an outside facility as he works here. Will refer to Dr. Gal Church to have scheduled at his office. Voiced appreciation. He is due for repeat CT of abd w/o contrast next month for 6 month surveillance. Order placed. BP at goal. Taking vitamin d as recommended. He did not receive appointment to establish care with PCP as referred last visit. Will assist with same. Immunizations are UTD. All questions answered & concerns addressed.     7/29/24  Mr. Zapien is a 57 yo AAM here today for HIV f/u visit. He is virally suppressed on Triumeq and tolerates it well. Labs 4/24 VL UD, Creatinine 1.28, eGFR >60, 3/24 CD4 1100.   Hep B s ab +, titer 22.  Amenable to Hep B vaccine booster dose today. Scheduled for colonoscopy 8/9/24 with plans to attend. BP at goal. Cholesterol well controlled. He does not currently have a PCP and is open to referral to [facility] for new PCP. Order placed. Has not heard from endocrine office for visit. Will plan to repeat CT without contrast 11/2024 as radiologist recommended. Last eye exam 2022 at [facility], will schedule a f/u exam. All questions answered & concerns addressed.           Past Medical History:   Diagnosis Date    Depression     GERD (gastroesophageal reflux disease)     HIV infection     Hyperlipidemia     Personal history of colonic polyps 11/28/2016    [facility] [place]- Dr. Oliverio Magana        Past Surgical History:   Procedure Laterality Date    COLONOSCOPY W/ POLYPECTOMY  11/28/2016    [facility] [place]- Dr. Oliverio Magana        Social History     Socioeconomic History    Marital status:    Tobacco Use    Smoking status: Never    Smokeless tobacco: Never   Substance and Sexual Activity    Alcohol use: Never    Drug use: Never    Sexual activity: Not Currently     Birth control/protection: Condom        No family history on file.     Review of patient's allergies indicates:   Allergen Reactions    Citrus and derivatives         Immunization History   Administered Date(s) Administered    COVID-19, vector-nr, rS-Ad26, PF (Steve) 04/09/2021    Hepatitis A / Hepatitis B 09/03/2015, 10/01/2015, 03/14/2016    Hepatitis B (recombinant) Adjuvanted, 2 dose 07/29/2024    Influenza - Quadrivalent 11/09/2016    Influenza - Quadrivalent - PF *Preferred* (6 months and older) 10/01/2022, 10/26/2023    Influenza - Trivalent - Fluarix, Flulaval, Fluzone, Afluria - PF 10/17/2024    Meningococcal Conjugate (MCV4P) 07/02/2018, 05/31/2019    Pneumococcal Conjugate - 13 Valent 09/03/2015    Pneumococcal Polysaccharide - 23 Valent 11/12/2015, 03/22/2021    Tdap  10/01/2015    Zoster Recombinant 08/30/2022, 02/17/2023        Review of Systems   Constitutional: Negative.    HENT: Negative.     Eyes: Negative.    Respiratory: Negative.     Cardiovascular: Negative.    Gastrointestinal: Negative.    Genitourinary: Negative.    Musculoskeletal: Negative.    Skin: Negative.    Neurological: Negative.    Endo/Heme/Allergies: Negative.    Psychiatric/Behavioral: Negative.     All other systems reviewed and are negative.         Objective:      /76 (BP Location: Left arm, Patient Position: Sitting)   Pulse 76   Temp 98.3 °F (36.8 °C) (Oral)   Resp 12   Ht 6' (1.829 m)   Wt 106 kg (233 lb 11 oz)   BMI 31.69 kg/m²      Physical Exam  Vitals reviewed.   Constitutional:       General: He is not in acute distress.     Appearance: Normal appearance. He is not ill-appearing, toxic-appearing or diaphoretic.   HENT:      Mouth/Throat:      Mouth: Mucous membranes are moist.      Pharynx: Oropharynx is clear.   Eyes:      Conjunctiva/sclera: Conjunctivae normal.   Cardiovascular:      Rate and Rhythm: Normal rate. Rhythm irregular.      Pulses: Normal pulses.      Heart sounds: Normal heart sounds.   Pulmonary:      Effort: Pulmonary effort is normal. No respiratory distress.      Breath sounds: Normal breath sounds.   Abdominal:      General: Abdomen is flat. Bowel sounds are normal.      Palpations: Abdomen is soft.   Musculoskeletal:         General: Normal range of motion.      Cervical back: Normal range of motion.   Lymphadenopathy:      Cervical: No cervical adenopathy.   Skin:     General: Skin is warm and dry.   Neurological:      General: No focal deficit present.      Mental Status: He is alert and oriented to person, place, and time. Mental status is at baseline.   Psychiatric:         Mood and Affect: Mood normal.         Behavior: Behavior normal.          Labs:   Lab Results   Component Value Date    WBC 6.88 03/18/2025    HGB 14.7 03/18/2025    HCT 44.0 03/18/2025     MCV 93.8 03/18/2025     03/18/2025       CMP  Sodium   Date Value Ref Range Status   10/29/2024 138 136 - 145 mmol/L Final     Potassium   Date Value Ref Range Status   10/29/2024 4.1 3.5 - 5.1 mmol/L Final     Chloride   Date Value Ref Range Status   10/29/2024 107 98 - 107 mmol/L Final     CO2   Date Value Ref Range Status   10/29/2024 26 23 - 31 mmol/L Final     Blood Urea Nitrogen   Date Value Ref Range Status   10/29/2024 14.9 8.4 - 25.7 mg/dL Final     Creatinine   Date Value Ref Range Status   10/29/2024 1.39 (H) 0.72 - 1.25 mg/dL Final     Calcium   Date Value Ref Range Status   10/29/2024 9.4 8.8 - 10.0 mg/dL Final     Albumin   Date Value Ref Range Status   10/29/2024 3.8 3.4 - 4.8 g/dL Final     Bilirubin Total   Date Value Ref Range Status   10/29/2024 0.3 <=1.5 mg/dL Final     ALP   Date Value Ref Range Status   10/29/2024 118 40 - 150 unit/L Final     AST   Date Value Ref Range Status   10/29/2024 23 5 - 34 unit/L Final     ALT   Date Value Ref Range Status   10/29/2024 21 0 - 55 unit/L Final     eGFR   Date Value Ref Range Status   10/29/2024 58 mL/min/1.73/m2 Final     Lab Results   Component Value Date    TSH 1.987 03/05/2024     Hep C Ab Interp   Date Value Ref Range Status   03/05/2024 Nonreactive Nonreactive Final     Syphilis Antibody   Date Value Ref Range Status   03/05/2024 Nonreactive Nonreactive, Equivocal Final     Cholesterol Total   Date Value Ref Range Status   03/05/2024 196 <=200 mg/dL Final     HDL Cholesterol   Date Value Ref Range Status   03/05/2024 41 35 - 60 mg/dL Final     Triglyceride   Date Value Ref Range Status   03/05/2024 117 34 - 140 mg/dL Final     Cholesterol/HDL Ratio   Date Value Ref Range Status   03/05/2024 5 0 - 5 Final     Very Low Density Lipoprotein   Date Value Ref Range Status   03/05/2024 23  Final     LDL Cholesterol   Date Value Ref Range Status   03/05/2024 132.00 50.00 - 140.00 mg/dL Final     Vitamin D   Date Value Ref Range Status    07/29/2024 29 (L) 30 - 80 ng/mL Final     Results for orders placed or performed in visit on 07/14/22   CD4 Lymphocytes   Result Value Ref Range    Patient Age 56     WBC Absolute 6,900 4,500 - 11,500 /mm3    Lymph Percent 44.4 28 - 48 %    Lymph Absolute 3,063.6 1,260 - 5,520 x10(3)/mcL    CD4 % 53.0 %    CD4 Absolute 1,623.708 (H) 589 - 1,505 unit/L    T Cell Interp       Normal total lymphocyte absolute count and normal percentage.  Increased CD4+ T helper cell absolute count and normal percentage.    Caryn Howe MD       Results for orders placed or performed in visit on 10/29/24   HIV-1 RNA, Quantitative, PCR with Reflex to Genotype   Result Value Ref Range    HIV-1 RNA Detect/Quant, P Undetected Undetected copies/mL     Results for orders placed or performed in visit on 03/05/24   Quantiferon Gold TB   Result Value Ref Range    QuantiFERON-Tb Gold Plus Result Negative Negative    TB1 Ag minus Nil Result 0.01 IU/mL    TB2 Ag minus Nil Result 0.01 IU/mL    Mitogen minus Nil Result >10.00 IU/mL    Nil Result 0.02 IU/mL     No results found for this or any previous visit.  Results for orders placed or performed in visit on 03/05/24   Urinalysis   Result Value Ref Range    Color, UA Yellow Yellow, Light-Yellow, Dark Yellow, Theresa, Straw    Appearance, UA Clear Clear    Specific Gravity, UA 1.029 1.005 - 1.030    pH, UA 5.5 5.0 - 8.5    Protein, UA Trace (A) Negative    Glucose, UA Normal Negative, Normal    Ketones, UA Negative Negative    Blood, UA Negative Negative    Bilirubin, UA Negative Negative    Urobilinogen, UA 1+ (A) 0.2, 1.0, Normal    Nitrites, UA Negative Negative    Leukocyte Esterase, UA 75 (A) Negative    WBC, UA 11-20 (A) None Seen, 0-2, 3-5, 0-5 /HPF    Bacteria, UA None Seen None Seen /HPF    Squamous Epithelial Cells, UA Trace (A) None Seen /HPF    Mucous, UA Trace (A) None Seen /LPF    Hyaline Casts, UA None Seen None Seen /lpf    RBC, UA 0-5 None Seen, 0-2, 3-5, 0-5 /HPF        Imaging: Reviewed most recent relevant imaging studies available, notable results highlighted in this note    Medications:     Current Outpatient Medications   Medication Instructions    amLODIPine (NORVASC) 10 mg, Oral    atorvastatin (LIPITOR) 10 mg, Oral, Nightly    dolutegravir-lamivudine  mg Tab 1 tablet, Oral, Daily    ergocalciferol (ERGOCALCIFEROL) 50,000 Units, Oral, Every 7 days    omeprazole (PRILOSEC) 20 mg, Oral    sertraline (ZOLOFT) 100 mg, Oral, Daily       Assessment:       Problem List Items Addressed This Visit    None  Visit Diagnoses         Asymptomatic HIV infection, with no history of HIV-related illness    -  Primary    Relevant Medications    dolutegravir-lamivudine  mg Tab    Other Relevant Orders    Urinalysis, Reflex to Urine Culture (Completed)    Quantiferon Gold TB    Hepatitis C Antibody    SYPHILIS ANTIBODY (WITH REFLEX RPR)    Chlamydia/GC, PCR    Comprehensive Metabolic Panel    CBC Auto Differential (Completed)    CD4 Lymphocytes    HIV-1 RNA, Quantitative, PCR with Reflex to Genotype      Major depressive disorder, single episode, unspecified        Relevant Medications    sertraline (ZOLOFT) 100 MG tablet      Hyperlipidemia, unspecified hyperlipidemia type        Relevant Orders    Hemoglobin A1C (Completed)    Lipid Panel      Primary hypertension        Relevant Orders    TSH      Vitamin D deficiency        Relevant Orders    Vitamin D      Adrenal mass 1 cm to 4 cm in diameter with no history of malignant neoplasm          Colon cancer screening          Irregular heart rate        Relevant Orders    Ambulatory referral/consult to Cardiology               Plan:      Asymptomatic HIV infection, with no history of HIV-related illness  -     dolutegravir-lamivudine  mg Tab; Take 1 tablet by mouth once daily.  Dispense: 30 tablet; Refill: 4  -     Urinalysis, Reflex to Urine Culture  -     Quantiferon Gold TB; Future; Expected date: 03/18/2025  -      Hepatitis C Antibody; Future; Expected date: 03/18/2025  -     SYPHILIS ANTIBODY (WITH REFLEX RPR); Future; Expected date: 03/18/2025  -     Chlamydia/GC, PCR  -     Comprehensive Metabolic Panel; Future; Expected date: 03/18/2025  -     CBC Auto Differential; Future; Expected date: 03/18/2025  -     CD4 Lymphocytes; Future; Expected date: 03/18/2025  -     HIV-1 RNA, Quantitative, PCR with Reflex to Genotype; Future; Expected date: 03/18/2025    Diagn[facility] 7/10/[facility] at [facility].  Baseline labs 8/2015: VL 6100, CD4 762.  Arely 8/2015: none.  ART history: Triumeq.  JAMAR history: none.   HLA- negative.     Adherence and sexual health counseling done.  Use condoms for all sexual encounters.  Blood precautions.   Discontinue Triumeq.  Replace with Dovato 1 po daily as prescribed.  Labs today.  RTC 3 months with Katelyn.     HIV Wellness:  Anal pap: 3/21 QNS, declines 8/30/22  Oral CT/GC: 5/19 Neg  Anal CT/GC: 5/19 Neg  Urine CT/GC: 3/24 Neg, 3/25  RPR: 3/24 NR, 3/25  Ophth: 9/22 [facility]  DEXA: 2/16 -1.1, 9/22 -1.4   Colon Cancer Screen: 11/2016 Dr. Magana c-scope with polypectomy; mother with history of colon cancer.     Major depressive disorder, single episode, unspecified  -     sertraline (ZOLOFT) 100 MG tablet; Take 1 tablet (100 mg total) by mouth once daily.  Dispense: 90 tablet; Refill: 3  Stable, has only been taking 100 mg per day of Zoloft.  Continue Zoloft at 100 mg/day dose.   Denies SI/HI/hallucinations. Pt appropriate.   Read positive daily meditations.  Avoid negative media. Set healthy boundaries.   Exercise daily. Keep a consistent sleep pattern. Encouraged balanced diet.   Establish a good social support system. Make positive changes to reduce stress.   Do not drink alcohol or use illicit drugs.  M[facility] health providers available at [facility], as well as other facilities.  List of providers and contact information given.     Hyperlipidemia, unspecified  hyperlipidemia type  -     Hemoglobin A1C; Future; Expected date: 03/18/2025  -     Lipid Panel; Future; Expected date: 03/18/2025  Repeat lipid panel today.   Continue atorvastatin 10 mg daily.   Decrease intake of fried & greasy foods.    Increase intake of Omega 3 rich foods in diet such as fatty fish, nuts, avocado, etc.  Avoid trans fat in diet, commonly found in packaged foods such as snacks/cakes/cookies.  Increase fiber intake.   Increase exercise to at least 30 minutes of moderate activity 3-5 days per week.    Primary hypertension  -     TSH; Future; Expected date: 03/18/2025  Controlled.   Continue Amlodipine 10 mg daily.   Medication compliance encouraged.  BP goal <130/80. Monitor BP at home & keep log of readings.  Low sodium diet, max 2 grams per day.  Weight control recommended.  Avoid illicit drug use and excessive alcohol intake.  Increase exercise activity, goal 30 minutes moderate exertion 3-5 times per week.  Stress reduction strategies such as meditation, deep breathing, stretching, prayer, social support system.     Vitamin D deficiency  -     Vitamin D; Future; Expected date: 03/18/2025  Continue ergocalciferol weekly as prescribed.   Check level today.     Adrenal mass 1 cm to 4 cm in diameter with no history of malignant neoplasm  Reschedule non-contrast CT  as previously ordered for 11/2024 as recommended by radiologist.     Colon cancer screening  Please provide pt with phone # to [facility] to schedule colonoscopy as referred last visit.     Irregular heart rate  -     Ambulatory referral/consult to Cardiology; Future; Expected date: 03/25/2025  2nd request referral to cardiology for evaluation and management.   EKG completed in office 3/2024.  Stressed importance of answering calls to schedule and attending appointments.            I spent a total of 43 minutes on the day of the visit.  This includes face to face time and non-face to face time preparing to see the patient  (eg, review of tests), obtaining and/or reviewing separately obtained history, documenting clinical information in the electronic or other health record, independently interpreting results and communicating results to the patient/family/caregiver, or care coordinator.

## 2025-03-19 LAB
AGE: 61
CD3+CD4+ CELLS # SPEC: 1047 UNIT/L (ref 589–1505)
CD3+CD4+ CELLS NFR BLD: 49.1 %
LYMPHOCYTES # BLD AUTO: 2132.8 X10(3)/MCL (ref 1260–5520)
LYMPHOCYTES NFR LN MANUAL: 31 % (ref 28–48)
LYMPHOMA - T-CELL MARKERS SPEC-IMP: NORMAL
WBC # BLD AUTO: 6880 /MM3 (ref 4500–11500)

## 2025-03-20 ENCOUNTER — OFFICE VISIT (OUTPATIENT)
Dept: CARDIOLOGY | Facility: CLINIC | Age: 60
End: 2025-03-20
Payer: COMMERCIAL

## 2025-03-20 VITALS
HEART RATE: 65 BPM | HEIGHT: 72 IN | BODY MASS INDEX: 31.97 KG/M2 | TEMPERATURE: 98 F | DIASTOLIC BLOOD PRESSURE: 84 MMHG | OXYGEN SATURATION: 99 % | RESPIRATION RATE: 20 BRPM | SYSTOLIC BLOOD PRESSURE: 128 MMHG | WEIGHT: 236 LBS

## 2025-03-20 DIAGNOSIS — I49.9 IRREGULAR HEART RATE: ICD-10-CM

## 2025-03-20 DIAGNOSIS — Z21 HIV INFECTION, UNSPECIFIED SYMPTOM STATUS: ICD-10-CM

## 2025-03-20 DIAGNOSIS — I49.1 PREMATURE ATRIAL BEATS: ICD-10-CM

## 2025-03-20 DIAGNOSIS — I10 HYPERTENSION, UNSPECIFIED TYPE: Primary | ICD-10-CM

## 2025-03-20 DIAGNOSIS — E78.5 HYPERLIPIDEMIA, UNSPECIFIED HYPERLIPIDEMIA TYPE: ICD-10-CM

## 2025-03-20 LAB — HIV1 RNA # PLAS NAA DL=20: NORMAL COPIES/ML

## 2025-03-20 PROCEDURE — 99215 OFFICE O/P EST HI 40 MIN: CPT | Mod: PBBFAC | Performed by: INTERNAL MEDICINE

## 2025-03-20 RX ORDER — ATORVASTATIN CALCIUM 20 MG/1
20 TABLET, FILM COATED ORAL NIGHTLY
Qty: 90 TABLET | Refills: 3 | Status: SHIPPED | OUTPATIENT
Start: 2025-03-20

## 2025-03-20 NOTE — PROGRESS NOTES
[facility]  Outpatient Cardiology Clinic    Chief Complaint: initial visit denies chest pain or sob no questions                              HPI:  Curry Bello 59 y.o. male with HIV, HTN, GERD who is referred by NP at ID clinic for irregular heart beat.  In 3/2024 he was noted to have an irregular heart beat. EKG at the time showed NSR with frequent PACs.   Today's EKG shows NSR with blocked PACs.    Pt reports feeling well with no CV complaints. He does not notice any palpitations. No exertional chest pain, shortness of breath, fatigue.  Patient also denies orthopnea, PND, lower extremity edema,  dizziness, lightheadedness or syncope.                                                                                                                                                                                                                                                                                                                                                                                                                                                                               CARDIAC TESTING:   none    Problem List[1]  Past Surgical History:   Procedure Laterality Date    COLONOSCOPY W/ POLYPECTOMY  11/28/2016    [facility]- Dr. Oliverio Magana     Social History[2]     No family history on file.  Review of patient's allergies indicates:   Allergen Reactions    Citrus and derivatives          ROS:                                                                                                                                                                             Negative except as stated in the history of present illness. See HPI for details.    PHYSICAL EXAM:  Visit Vitals  BP (!) 142/85 (BP Location: Left arm, Patient Position: Sitting)   Pulse 65   Temp 98.2 °F (36.8 °C) (Oral)   Resp 20   Ht 6' (1.829 m)   Wt 107 kg (236 lb)   SpO2 99%   BMI 32.01 kg/m²       General: alert  and oriented/no acute distress  Eye: EOMI/normal conjunctiva/no xanthelasma  HENT: normocephalic/moist oral mucosa  Neck: supple/nontender/no carotid bruit  Respiratory: lungs CTA/nonlabored respirations/BS equal/symmetrical expansion/no  chest wall tenderness  Cardiovascular: normal rate/normal rhythm/no murmur/normal peripheral perfusion/no  edema/no JVD  Gastrointestinal: soft/nontender  Musculoskeletal: normal ROM  Integumentary: warm/dry/pink/intact  Neurologic: alert/oriented/normal sensory/no focal deficits  Psychiatric: cooperative/appropriate mood and affect/normal judgment    Current Outpatient Medications   Medication Instructions    amLODIPine (NORVASC) 10 mg, Oral    atorvastatin (LIPITOR) 10 mg, Oral, Nightly    dolutegravir-lamivudine  mg Tab 1 tablet, Oral, Daily    ergocalciferol (ERGOCALCIFEROL) 50,000 Units, Oral, Every 7 days    omeprazole (PRILOSEC) 20 mg, Oral    sertraline (ZOLOFT) 100 mg, Oral, Daily        All medications, laboratory studies, cardiac diagnostic imaging independently reviewed.     Lab Results   Component Value Date    .00 03/18/2025    .00 03/05/2024    TRIG 84 03/18/2025    TRIG 117 03/05/2024    CREATININE 1.21 03/18/2025    K 4.8 03/18/2025        ASSESSMENT/PLAN:    Frequent PACs and blocked PACs  Pt asymptomatic  PACs rarely cause hemodynamic issues and no further workup or treatment is warranted at this time    HLP     Pt is supposed to take lipitor 10mg but forgets as he is instructed to take it at night   10 year ASCVD risk 14.7%. Will increase Lipitor to 20 mg and advised him to take it in the morning with other meds.      HTN  Initial /85 with repeat 128/84  Review of recent BP measurements are within normal limits  Patient states he forgot to take amlodipine this morning     HIV  Management per ID.  Currently no signs or symptoms concerning for cardiovascular disease (due to HIV or HAART).    FU PRN           [1] There is no problem  list on file for this patient.  [2]   Social History  Socioeconomic History    Marital status:    Tobacco Use    Smoking status: Former     Types: Cigarettes    Smokeless tobacco: Never   Substance and Sexual Activity    Alcohol use: Not Currently    Drug use: Not Currently    Sexual activity: Not Currently     Partners: Female     Birth control/protection: Abstinence

## 2025-03-21 LAB
GAMMA INTERFERON BACKGROUND BLD IA-ACNC: 0.02 IU/ML
M TB IFN-G BLD-IMP: NEGATIVE
M TB IFN-G CD4+ BCKGRND COR BLD-ACNC: 0.1 IU/ML
M TB IFN-G CD4+CD8+ BCKGRND COR BLD-ACNC: 0.05 IU/ML
MITOGEN IGNF BCKGRD COR BLD-ACNC: 9.98 IU/ML

## 2025-03-26 ENCOUNTER — HOSPITAL ENCOUNTER (OUTPATIENT)
Dept: RADIOLOGY | Facility: HOSPITAL | Age: 60
Discharge: HOME | End: 2025-03-26
Attending: NURSE PRACTITIONER
Payer: COMMERCIAL

## 2025-03-26 DIAGNOSIS — E27.8 OTHER SPECIFIED DISORDERS OF ADRENAL GLAND: ICD-10-CM

## 2025-03-26 PROCEDURE — 74150 CT ABDOMEN W/O CONTRAST: CPT | Mod: TC

## 2025-03-28 ENCOUNTER — TELEPHONE (OUTPATIENT)
Dept: INFECTIOUS DISEASES | Facility: CLINIC | Age: 60
End: 2025-03-28
Payer: COMMERCIAL

## 2025-03-28 ENCOUNTER — RESULTS FOLLOW-UP (OUTPATIENT)
Dept: INFECTIOUS DISEASES | Facility: CLINIC | Age: 60
End: 2025-03-28

## 2025-03-28 NOTE — TELEPHONE ENCOUNTER
----- Message from FLACO Dockery sent at 3/28/2025 10:54 AM CDT -----  Benedicto Zapien, I have reviewed your CT results. The lesion has not changed and is stable in size.  Will continue to follow with repeat imaging in 6-12 months as recommended. Please let me know if you   have any questions.     Thank you,     Katelyn Abdalla NP   ----- Message -----  From: Sammi, Rad Results In  Sent: 3/26/2025  10:23 AM CDT  To: FLACO Pa     Poor bolus formation

## 2025-03-28 NOTE — PROGRESS NOTES
Benedicto Zapien, I have reviewed your CT results. The lesion has not changed and is stable in size.  Will continue to follow with repeat imaging in 6-12 months as recommended. Please let me know if you have any questions.     Thank you,     Katelyn Abdalla NP

## 2025-03-28 NOTE — TELEPHONE ENCOUNTER
Phoned patient. Discussed CT results. The lesion has not changed and is stable in size. Will continue to follow with repeat imaging in 6-12 months as recommended. Voiced understanding and appreciates call.

## 2025-06-17 ENCOUNTER — OFFICE VISIT (OUTPATIENT)
Dept: INFECTIOUS DISEASES | Facility: CLINIC | Age: 60
End: 2025-06-17
Payer: COMMERCIAL

## 2025-06-17 ENCOUNTER — LAB VISIT (OUTPATIENT)
Dept: LAB | Facility: HOSPITAL | Age: 60
End: 2025-06-17
Attending: NURSE PRACTITIONER
Payer: COMMERCIAL

## 2025-06-17 VITALS
SYSTOLIC BLOOD PRESSURE: 131 MMHG | DIASTOLIC BLOOD PRESSURE: 68 MMHG | BODY MASS INDEX: 31.94 KG/M2 | RESPIRATION RATE: 10 BRPM | HEIGHT: 72 IN | HEART RATE: 71 BPM | TEMPERATURE: 99 F | WEIGHT: 235.81 LBS

## 2025-06-17 DIAGNOSIS — R35.0 URINARY FREQUENCY: ICD-10-CM

## 2025-06-17 DIAGNOSIS — Z21 ASYMPTOMATIC HIV INFECTION, WITH NO HISTORY OF HIV-RELATED ILLNESS: Primary | ICD-10-CM

## 2025-06-17 DIAGNOSIS — Z21 ASYMPTOMATIC HIV INFECTION, WITH NO HISTORY OF HIV-RELATED ILLNESS: ICD-10-CM

## 2025-06-17 LAB
ALBUMIN SERPL-MCNC: 4 G/DL (ref 3.4–4.8)
ALBUMIN/GLOB SERPL: 0.9 RATIO (ref 1.1–2)
ALP SERPL-CCNC: 117 UNIT/L (ref 40–150)
ALT SERPL-CCNC: 20 UNIT/L (ref 0–55)
ANION GAP SERPL CALC-SCNC: 5 MEQ/L
AST SERPL-CCNC: 21 UNIT/L (ref 11–45)
BILIRUB SERPL-MCNC: 0.5 MG/DL
BUN SERPL-MCNC: 16 MG/DL (ref 8.4–25.7)
CALCIUM SERPL-MCNC: 9.6 MG/DL (ref 8.8–10)
CHLORIDE SERPL-SCNC: 106 MMOL/L (ref 98–107)
CO2 SERPL-SCNC: 28 MMOL/L (ref 23–31)
CREAT SERPL-MCNC: 1.32 MG/DL (ref 0.72–1.25)
CREAT/UREA NIT SERPL: 12
GFR SERPLBLD CREATININE-BSD FMLA CKD-EPI: >60 ML/MIN/1.73/M2
GLOBULIN SER-MCNC: 4.3 GM/DL (ref 2.4–3.5)
GLUCOSE SERPL-MCNC: 129 MG/DL (ref 82–115)
POTASSIUM SERPL-SCNC: 4.7 MMOL/L (ref 3.5–5.1)
PROT SERPL-MCNC: 8.3 GM/DL (ref 5.8–7.6)
PSA SERPL-MCNC: 0.54 NG/ML
SODIUM SERPL-SCNC: 139 MMOL/L (ref 136–145)

## 2025-06-17 PROCEDURE — 3078F DIAST BP <80 MM HG: CPT | Mod: CPTII,,, | Performed by: NURSE PRACTITIONER

## 2025-06-17 PROCEDURE — 80053 COMPREHEN METABOLIC PANEL: CPT

## 2025-06-17 PROCEDURE — 1160F RVW MEDS BY RX/DR IN RCRD: CPT | Mod: CPTII,,, | Performed by: NURSE PRACTITIONER

## 2025-06-17 PROCEDURE — 3075F SYST BP GE 130 - 139MM HG: CPT | Mod: CPTII,,, | Performed by: NURSE PRACTITIONER

## 2025-06-17 PROCEDURE — 99214 OFFICE O/P EST MOD 30 MIN: CPT | Mod: S$PBB,,, | Performed by: NURSE PRACTITIONER

## 2025-06-17 PROCEDURE — 36415 COLL VENOUS BLD VENIPUNCTURE: CPT

## 2025-06-17 PROCEDURE — 99214 OFFICE O/P EST MOD 30 MIN: CPT | Mod: PBBFAC | Performed by: NURSE PRACTITIONER

## 2025-06-17 PROCEDURE — 87536 HIV-1 QUANT&REVRSE TRNSCRPJ: CPT

## 2025-06-17 PROCEDURE — 3008F BODY MASS INDEX DOCD: CPT | Mod: CPTII,,, | Performed by: NURSE PRACTITIONER

## 2025-06-17 PROCEDURE — 1159F MED LIST DOCD IN RCRD: CPT | Mod: CPTII,,, | Performed by: NURSE PRACTITIONER

## 2025-06-17 PROCEDURE — 84153 ASSAY OF PSA TOTAL: CPT

## 2025-06-17 PROCEDURE — 3044F HG A1C LEVEL LT 7.0%: CPT | Mod: CPTII,,, | Performed by: NURSE PRACTITIONER

## 2025-06-17 NOTE — PROGRESS NOTES
Patient ID: Curry Bello 59 y.o.     Chief Complaint:   Chief Complaint   Patient presents with    Followup HIV     Denies problems        HPI:    6/17/25  Mr. Zapien is a 58 yo AAM presenting today for HIV f/u visit. He has switched from Triumeq to Dovato and is tolerating it well without any noted side effects. Labs 3/25 VL UD, Cd4 1047. Will repeat labs today to ensure retained viral suppression. He did attend visit with cardiology, cleared with f/u PRN.  Atorvastatin increased from 10 to 20 mg per cardiology, taking daily. Will repeat lipid panel next visit. He tells me that he has noted an increase in urinary frequency, primarily during the work day. Has increased water intake, especially when at work.  Denies nocturia. Will check PSA today. Voiced appreciation. Denies any urgency or burning with urination. All questions answered & concerns addressed.    3/18/25  Mr. Zapien is a 58 yo AAM here today for HIV f/u visit.  He is taking Triumeq daily with viral suppression. Labs 10/24 VL UD, Cd4 1310, creatinine 1.39, eGFR 58. Historic records reviewed. Diagnosed with HIV 8/2015, baseline VL 6100, CD4 762, Arely none. Discussed possible increased risk for cardiac disease with abacavir based regimens.  He is a good candidate for ART revision to Dovato and is amenable to same. He did have repeat CT of abdomen without contrast as ordered, became confused and thought that it had already been completed. Will reschedule. Missed calls from [facility] for repeat colonoscopy. Will provide him with # to call and schedule appointment. He never did attend cardiology appt as referred 3/2024 for irregular heart rate. Will process new referral for same. Denies chest pain, palpitations, diaphoresis. All questions answered & concerns addressed.     10/29/24  Mr. Zapien is a 58 yo AAM presenting today for HIV f/u visit.  He takes Triumeq daily and is virally suppressed.  Labs 7/29/24 VL UD, Creatinine 1.25, AST 23,  ALT 21.  He tells me that he cancelled his scheduled colonoscopy, would prefer to have it performed at an outside facility as he works here. Will refer to Dr. Gal Church to have scheduled at his office. Voiced appreciation. He is due for repeat CT of abd w/o contrast next month for 6 month surveillance. Order placed. BP at goal. Taking vitamin d as recommended. He did not receive appointment to establish care with PCP as referred last visit. Will assist with same. Immunizations are UTD. All questions answered & concerns addressed.              Past Medical History:   Diagnosis Date    Depression     GERD (gastroesophageal reflux disease)     HIV infection     Hyperlipidemia     Personal history of colonic polyps 11/28/2016    [facility] [place]- Dr. Oliverio Magana        Past Surgical History:   Procedure Laterality Date    COLONOSCOPY W/ POLYPECTOMY  11/28/2016    [facility] [place]- Dr. Oliverio Magana        Social History     Socioeconomic History    Marital status:    Tobacco Use    Smoking status: Former     Types: Cigarettes    Smokeless tobacco: Never   Substance and Sexual Activity    Alcohol use: Not Currently     Comment: Denies    Drug use: Not Currently     Comment: Denies    Sexual activity: Not Currently     Partners: Female     Birth control/protection: Abstinence        No family history on file.     Review of patient's allergies indicates:   Allergen Reactions    Citrus and derivatives         Immunization History   Administered Date(s) Administered    COVID-19, vector-nr, rS-Ad26, PF (Steve) 04/09/2021    Hepatitis A / Hepatitis B 09/03/2015, 10/01/2015, 03/14/2016    Hepatitis B (recombinant) Adjuvanted, 2 dose 07/29/2024    Influenza - Quadrivalent 11/09/2016    Influenza - Quadrivalent - PF *Preferred* (6 months and older) 10/01/2022, 10/26/2023    Influenza - Trivalent - Fluarix, Flulaval, Fluzone, Afluria - PF 10/17/2024    Meningococcal Conjugate  (MCV4P) 07/02/2018, 05/31/2019    Pneumococcal Conjugate - 13 Valent 09/03/2015    Pneumococcal Polysaccharide - 23 Valent 11/12/2015, 03/22/2021    Tdap 10/01/2015    Zoster Recombinant 08/30/2022, 02/17/2023        Review of Systems   Constitutional: Negative.    HENT: Negative.     Eyes: Negative.    Respiratory: Negative.     Cardiovascular: Negative.    Gastrointestinal: Negative.    Genitourinary:  Positive for frequency.   Musculoskeletal: Negative.    Skin: Negative.    Neurological: Negative.    Endo/Heme/Allergies: Negative.    Psychiatric/Behavioral: Negative.     All other systems reviewed and are negative.         Objective:      /68 (BP Location: Left arm, Patient Position: Sitting)   Pulse 71   Temp 99.4 °F (37.4 °C) (Oral)   Resp 10   Ht 6' (1.829 m)   Wt 107 kg (235 lb 12.8 oz)   BMI 31.98 kg/m²      Physical Exam  Vitals reviewed.   Constitutional:       General: He is not in acute distress.     Appearance: Normal appearance. He is not toxic-appearing.   HENT:      Mouth/Throat:      Mouth: Mucous membranes are moist.      Pharynx: Oropharynx is clear.   Eyes:      Conjunctiva/sclera: Conjunctivae normal.   Cardiovascular:      Rate and Rhythm: Normal rate and regular rhythm.   Pulmonary:      Effort: Pulmonary effort is normal. No respiratory distress.      Breath sounds: Normal breath sounds.   Abdominal:      General: Abdomen is flat. Bowel sounds are normal.      Palpations: Abdomen is soft.   Musculoskeletal:         General: Normal range of motion.      Cervical back: Normal range of motion.   Lymphadenopathy:      Cervical: No cervical adenopathy.   Skin:     General: Skin is warm and dry.   Neurological:      General: No focal deficit present.      Mental Status: He is alert and oriented to person, place, and time. Mental status is at baseline.   Psychiatric:         Mood and Affect: Mood normal.         Behavior: Behavior normal.          Labs:   Lab Results   Component Value  Date    WBC 6.88 03/18/2025    HGB 14.7 03/18/2025    HCT 44.0 03/18/2025    MCV 93.8 03/18/2025     03/18/2025       CMP  Sodium   Date Value Ref Range Status   03/18/2025 140 136 - 145 mmol/L Final     Potassium   Date Value Ref Range Status   03/18/2025 4.8 3.5 - 5.1 mmol/L Final     Chloride   Date Value Ref Range Status   03/18/2025 106 98 - 107 mmol/L Final     CO2   Date Value Ref Range Status   03/18/2025 25 23 - 31 mmol/L Final     Glucose   Date Value Ref Range Status   03/18/2025 106 82 - 115 mg/dL Final     Blood Urea Nitrogen   Date Value Ref Range Status   03/18/2025 9.4 8.4 - 25.7 mg/dL Final     Creatinine   Date Value Ref Range Status   03/18/2025 1.21 0.72 - 1.25 mg/dL Final     Calcium   Date Value Ref Range Status   03/18/2025 9.7 8.8 - 10.0 mg/dL Final     Protein Total   Date Value Ref Range Status   03/18/2025 8.2 (H) 5.8 - 7.6 gm/dL Final     Albumin   Date Value Ref Range Status   03/18/2025 3.8 3.4 - 4.8 g/dL Final     Bilirubin Total   Date Value Ref Range Status   03/18/2025 0.4 <=1.5 mg/dL Final     ALP   Date Value Ref Range Status   03/18/2025 109 40 - 150 unit/L Final     AST   Date Value Ref Range Status   03/18/2025 22 5 - 34 unit/L Final     ALT   Date Value Ref Range Status   03/18/2025 28 0 - 55 unit/L Final     eGFR   Date Value Ref Range Status   03/18/2025 >60 mL/min/1.73/m2 Final     Comment:     Estimated GFR calculated using the CKD-EPI creatinine (2021) equation.     Lab Results   Component Value Date    TSH 2.092 03/18/2025     Hep C Ab Interp   Date Value Ref Range Status   03/18/2025 Nonreactive Nonreactive Final     Syphilis Antibody   Date Value Ref Range Status   03/18/2025 Nonreactive Nonreactive, Equivocal Final     Cholesterol Total   Date Value Ref Range Status   03/18/2025 197 <=200 mg/dL Final     HDL Cholesterol   Date Value Ref Range Status   03/18/2025 41 35 - 60 mg/dL Final     Triglyceride   Date Value Ref Range Status   03/18/2025 84 34 - 140 mg/dL  Final     Cholesterol/HDL Ratio   Date Value Ref Range Status   03/18/2025 5 0 - 5 Final     Very Low Density Lipoprotein   Date Value Ref Range Status   03/18/2025 17  Final     LDL Cholesterol   Date Value Ref Range Status   03/18/2025 139.00 50.00 - 140.00 mg/dL Final     Comment:     LDL calculated using the Friedewald equation.     Vitamin D   Date Value Ref Range Status   03/18/2025 31 30 - 80 ng/mL Final     Results for orders placed or performed in visit on 03/18/25   CD4 Lymphocytes   Result Value Ref Range    Patient Age 59     WBC Absolute 6,880 4,500 - 11,500 /mm3    Lymph Percent 31 28 - 48 %    Lymph Absolute 2,132.8 1,260 - 5,520 x10(3)/mcL    CD4 % 49.1 %    CD4 Absolute 1,047 589 - 1,505 unit/L    T Cell Interp       Normal absolute lymphocyte count with normal absolute CD4+ lymphocyte count.    Selwyn Tam M.D.       Narrative    This test was developed and its performance characteristics determined by [facility]. It has not been cleared or approved by the US Food and Drug Administration. The FDA does not require this test to go through premarket FDA review. This test is used for clinical purposes. It should not be regarded as investigational or for research. This laboratory is certified under the Clinical Laboratory Improvement Amendments (CLIA) as qualified to perform high complexity clinical laboratory testing.     Results for orders placed or performed in visit on 03/18/25   HIV-1 RNA, Quantitative, PCR with Reflex to Genotype   Result Value Ref Range    HIV-1 RNA Detect/Quant, P Undetected Undetected copies/mL     Results for orders placed or performed in visit on 03/18/25   Quantiferon Gold TB   Result Value Ref Range    QuantiFERON-Tb Gold Plus Result Negative Negative    TB1 Ag minus Nil Result 0.10 IU/mL    TB2 Ag minus Nil Result 0.05 IU/mL    Mitogen minus Nil Result 9.98 IU/mL    Nil Result 0.02 IU/mL     No results found for this or any previous  visit.  Results for orders placed or performed in visit on 03/05/24   Urinalysis   Result Value Ref Range    Color, UA Yellow Yellow, Light-Yellow, Dark Yellow, Theresa, Straw    Appearance, UA Clear Clear    Specific Gravity, UA 1.029 1.005 - 1.030    pH, UA 5.5 5.0 - 8.5    Protein, UA Trace (A) Negative    Glucose, UA Normal Negative, Normal    Ketones, UA Negative Negative    Blood, UA Negative Negative    Bilirubin, UA Negative Negative    Urobilinogen, UA 1+ (A) 0.2, 1.0, Normal    Nitrites, UA Negative Negative    Leukocyte Esterase, UA 75 (A) Negative    WBC, UA 11-20 (A) None Seen, 0-2, 3-5, 0-5 /HPF    Bacteria, UA None Seen None Seen /HPF    Squamous Epithelial Cells, UA Trace (A) None Seen /HPF    Mucous, UA Trace (A) None Seen /LPF    Hyaline Casts, UA None Seen None Seen /lpf    RBC, UA 0-5 None Seen, 0-2, 3-5, 0-5 /HPF       Imaging: Reviewed most recent relevant imaging studies available, notable results highlighted in this note    Medications:     Current Outpatient Medications   Medication Instructions    amLODIPine (NORVASC) 10 mg, Oral    atorvastatin (LIPITOR) 20 mg, Oral, Nightly    dolutegravir-lamivudine  mg Tab 1 tablet, Oral, Daily    ergocalciferol (ERGOCALCIFEROL) 50,000 Units, Oral, Every 7 days    omeprazole (PRILOSEC) 20 mg, Oral    sertraline (ZOLOFT) 100 mg, Oral, Daily       Assessment:       Problem List Items Addressed This Visit    None  Visit Diagnoses         Asymptomatic HIV infection, with no history of HIV-related illness    -  Primary    Relevant Orders    HIV RNA, Quantitative, PCR    Comprehensive Metabolic Panel      Urinary frequency        Relevant Orders    PSA, Screening               Plan:      Asymptomatic HIV infection, with no history of HIV-related illness  -     HIV RNA, Quantitative, PCR; Future; Expected date: 06/17/2025  -     Comprehensive Metabolic Panel; Future; Expected date: 06/17/2025  Diagnosed 7/10/2015 at [facility].  Baseline  labs 8/2015: VL 6100, CD4 762.  Arely 8/2015: none.  ART history: Triumeq.  JAMAR history: none.   HLA- negative.      Adherence and sexual health counseling done.  Use condoms for all sexual encounters.  Blood precautions.   Continue Dovato 1 po daily as prescribed.  Labs today.  RTC 3 months with Katelyn.     HIV Wellness:  Anal pap: 3/21 QNS, declines 8/30/22  Oral CT/GC: 5/19 Neg  Anal CT/GC: 5/19 Neg  Urine CT/GC: 3/24 Neg, 3/25 Neg  RPR: 3/24 NR, 3/25 NR  Ophth: 9/22 [facility]  DEXA: 2/16 -1.1, 9/22 -1.4   Colon Cancer Screen: 11/2016 Dr. Magana c-scope with polypectomy; mother with history of colon cancer.     Urinary frequency  -     Cancel: PSA, Screening; Future; Expected date: 06/17/2025  -     PSA, Screening; Future; Expected date: 06/17/2025  Screening PSA today.     Major depressive disorder, single episode, unspecified  Stable, has only been taking 100 mg per day of Zoloft.  Continue Zoloft at 100 mg/day dose.   Denies SI/HI/hallucinations. Pt appropriate.   Read positive daily meditations.  Avoid negative media. Set healthy boundaries.   Exercise daily. Keep a consistent sleep pattern. Encouraged balanced diet.   Establish a good social support system. Make positive changes to reduce stress.   Do not drink alcohol or use illicit drugs.  Mental health providers available at [facility], as well as other facilities.  List of providers and contact information given.      Hyperlipidemia, unspecified hyperlipidemia type  Repeat lipid panel next visit.    Continue atorvastatin 20 mg daily.   Decrease intake of fried & greasy foods.    Increase intake of Omega 3 rich foods in diet such as fatty fish, nuts, avocado, etc.  Avoid trans fat in diet, commonly found in packaged foods such as snacks/cakes/cookies.  Increase fiber intake.   Increase exercise to at least 30 minutes of moderate activity 3-5 days per week.     Primary hypertension  Controlled.   Continue Amlodipine 10 mg daily.    Medication compliance encouraged.  BP goal <130/80. Monitor BP at home & keep log of readings.  Low sodium diet, max 2 grams per day.  Weight control recommended.  Avoid illicit drug use and excessive alcohol intake.  Increase exercise activity, goal 30 minutes moderate exertion 3-5 times per week.  Stress reduction strategies such as meditation, deep breathing, stretching, prayer, social support system.      Vitamin D deficiency  Improved.  Continue ergocalciferol weekly as prescribed.      Adrenal mass 1 cm to 4 cm in diameter with no history of malignant neoplasm  Stable 3/2025.  Repeat CT in 6-12 months to document stability.     Colon cancer screening  Encouraged pt to schedule f/u colonoscopy, agrees to do so.

## 2025-06-18 LAB
HIV1 RNA # SERPL NAA+PROBE: NOT DETECTED {COPIES}/ML
HIV1 RNA SERPL NAA+PROBE-LOG#: NOT DETECTED {LOG_COPIES}/ML

## 2025-07-07 DIAGNOSIS — Z21 ASYMPTOMATIC HIV INFECTION, WITH NO HISTORY OF HIV-RELATED ILLNESS: ICD-10-CM

## 2025-07-07 RX ORDER — DOLUTEGRAVIR SODIUM AND LAMIVUDINE 50; 300 MG/1; MG/1
1 TABLET, FILM COATED ORAL
Qty: 30 TABLET | Refills: 4 | Status: SHIPPED | OUTPATIENT
Start: 2025-07-07

## 2025-08-27 DIAGNOSIS — E55.9 VITAMIN D DEFICIENCY: ICD-10-CM

## 2025-08-27 RX ORDER — ERGOCALCIFEROL 1.25 MG/1
50000 CAPSULE ORAL
Qty: 12 CAPSULE | Refills: 0 | Status: SHIPPED | OUTPATIENT
Start: 2025-08-27